# Patient Record
Sex: MALE | Race: WHITE | NOT HISPANIC OR LATINO
[De-identification: names, ages, dates, MRNs, and addresses within clinical notes are randomized per-mention and may not be internally consistent; named-entity substitution may affect disease eponyms.]

---

## 2017-02-14 ENCOUNTER — APPOINTMENT (OUTPATIENT)
Dept: PAIN MANAGEMENT | Facility: CLINIC | Age: 62
End: 2017-02-14

## 2017-02-14 VITALS
DIASTOLIC BLOOD PRESSURE: 77 MMHG | WEIGHT: 173 LBS | BODY MASS INDEX: 24.77 KG/M2 | HEART RATE: 60 BPM | SYSTOLIC BLOOD PRESSURE: 110 MMHG | HEIGHT: 70 IN

## 2017-04-14 ENCOUNTER — NON-APPOINTMENT (OUTPATIENT)
Age: 62
End: 2017-04-14

## 2017-04-14 ENCOUNTER — APPOINTMENT (OUTPATIENT)
Dept: ELECTROPHYSIOLOGY | Facility: CLINIC | Age: 62
End: 2017-04-14

## 2017-04-14 VITALS
SYSTOLIC BLOOD PRESSURE: 111 MMHG | BODY MASS INDEX: 24.77 KG/M2 | HEART RATE: 57 BPM | DIASTOLIC BLOOD PRESSURE: 77 MMHG | OXYGEN SATURATION: 100 % | WEIGHT: 173 LBS | HEIGHT: 70 IN

## 2017-04-14 DIAGNOSIS — I10 ESSENTIAL (PRIMARY) HYPERTENSION: ICD-10-CM

## 2017-04-21 ENCOUNTER — OUTPATIENT (OUTPATIENT)
Dept: OUTPATIENT SERVICES | Facility: HOSPITAL | Age: 62
LOS: 1 days | End: 2017-04-21
Payer: COMMERCIAL

## 2017-04-21 ENCOUNTER — APPOINTMENT (OUTPATIENT)
Dept: CV DIAGNOSITCS | Facility: HOSPITAL | Age: 62
End: 2017-04-21

## 2017-04-21 DIAGNOSIS — I10 ESSENTIAL (PRIMARY) HYPERTENSION: ICD-10-CM

## 2017-04-21 PROCEDURE — 93325 DOPPLER ECHO COLOR FLOW MAPG: CPT | Mod: 26

## 2017-04-21 PROCEDURE — 93312 ECHO TRANSESOPHAGEAL: CPT | Mod: 26

## 2017-04-21 PROCEDURE — 93320 DOPPLER ECHO COMPLETE: CPT | Mod: 26

## 2017-04-21 PROCEDURE — 93312 ECHO TRANSESOPHAGEAL: CPT

## 2017-04-21 PROCEDURE — 76376 3D RENDER W/INTRP POSTPROCES: CPT

## 2017-04-21 PROCEDURE — 93320 DOPPLER ECHO COMPLETE: CPT

## 2017-04-21 PROCEDURE — 76376 3D RENDER W/INTRP POSTPROCES: CPT | Mod: 26

## 2017-04-21 PROCEDURE — 93325 DOPPLER ECHO COLOR FLOW MAPG: CPT

## 2017-04-26 ENCOUNTER — APPOINTMENT (OUTPATIENT)
Dept: CV DIAGNOSITCS | Facility: HOSPITAL | Age: 62
End: 2017-04-26

## 2017-04-26 ENCOUNTER — OUTPATIENT (OUTPATIENT)
Dept: OUTPATIENT SERVICES | Facility: HOSPITAL | Age: 62
LOS: 1 days | End: 2017-04-26
Payer: COMMERCIAL

## 2017-04-26 DIAGNOSIS — I10 ESSENTIAL (PRIMARY) HYPERTENSION: ICD-10-CM

## 2017-04-26 PROCEDURE — 93350 STRESS TTE ONLY: CPT | Mod: 26

## 2017-04-26 PROCEDURE — 93325 DOPPLER ECHO COLOR FLOW MAPG: CPT

## 2017-04-26 PROCEDURE — 93016 CV STRESS TEST SUPVJ ONLY: CPT

## 2017-04-26 PROCEDURE — 93018 CV STRESS TEST I&R ONLY: CPT

## 2017-04-26 PROCEDURE — 93320 DOPPLER ECHO COMPLETE: CPT

## 2017-04-26 PROCEDURE — 93351 STRESS TTE COMPLETE: CPT

## 2017-04-26 PROCEDURE — 93325 DOPPLER ECHO COLOR FLOW MAPG: CPT | Mod: 26

## 2017-04-26 PROCEDURE — 93320 DOPPLER ECHO COMPLETE: CPT | Mod: 26

## 2017-04-27 ENCOUNTER — FORM ENCOUNTER (OUTPATIENT)
Age: 62
End: 2017-04-27

## 2017-05-08 ENCOUNTER — CHART COPY (OUTPATIENT)
Age: 62
End: 2017-05-08

## 2017-05-19 ENCOUNTER — APPOINTMENT (OUTPATIENT)
Dept: ELECTROPHYSIOLOGY | Facility: CLINIC | Age: 62
End: 2017-05-19

## 2017-05-22 ENCOUNTER — APPOINTMENT (OUTPATIENT)
Dept: PAIN MANAGEMENT | Facility: CLINIC | Age: 62
End: 2017-05-22

## 2017-05-22 VITALS
SYSTOLIC BLOOD PRESSURE: 111 MMHG | BODY MASS INDEX: 24.77 KG/M2 | DIASTOLIC BLOOD PRESSURE: 75 MMHG | HEIGHT: 70 IN | WEIGHT: 173 LBS | HEART RATE: 53 BPM

## 2017-05-25 ENCOUNTER — FORM ENCOUNTER (OUTPATIENT)
Age: 62
End: 2017-05-25

## 2017-05-26 ENCOUNTER — APPOINTMENT (OUTPATIENT)
Dept: MRI IMAGING | Facility: HOSPITAL | Age: 62
End: 2017-05-26

## 2017-05-26 ENCOUNTER — OUTPATIENT (OUTPATIENT)
Dept: OUTPATIENT SERVICES | Facility: HOSPITAL | Age: 62
LOS: 1 days | End: 2017-05-26
Payer: COMMERCIAL

## 2017-05-26 DIAGNOSIS — Z00.00 ENCOUNTER FOR GENERAL ADULT MEDICAL EXAMINATION WITHOUT ABNORMAL FINDINGS: ICD-10-CM

## 2017-05-26 PROCEDURE — 75561 CARDIAC MRI FOR MORPH W/DYE: CPT | Mod: 26

## 2017-05-26 PROCEDURE — A9585: CPT

## 2017-05-26 PROCEDURE — 75561 CARDIAC MRI FOR MORPH W/DYE: CPT

## 2017-06-16 ENCOUNTER — OUTPATIENT (OUTPATIENT)
Dept: OUTPATIENT SERVICES | Facility: HOSPITAL | Age: 62
LOS: 1 days | End: 2017-06-16
Payer: COMMERCIAL

## 2017-06-16 VITALS
OXYGEN SATURATION: 100 % | TEMPERATURE: 98 F | DIASTOLIC BLOOD PRESSURE: 75 MMHG | SYSTOLIC BLOOD PRESSURE: 118 MMHG | RESPIRATION RATE: 16 BRPM | HEART RATE: 48 BPM | WEIGHT: 167.99 LBS | HEIGHT: 70 IN

## 2017-06-16 DIAGNOSIS — R55 SYNCOPE AND COLLAPSE: ICD-10-CM

## 2017-06-16 DIAGNOSIS — R00.2 PALPITATIONS: ICD-10-CM

## 2017-06-16 DIAGNOSIS — Z90.89 ACQUIRED ABSENCE OF OTHER ORGANS: Chronic | ICD-10-CM

## 2017-06-16 LAB
ALBUMIN SERPL ELPH-MCNC: 4.3 G/DL — SIGNIFICANT CHANGE UP (ref 3.3–5)
ALP SERPL-CCNC: 55 U/L — SIGNIFICANT CHANGE UP (ref 40–120)
ALT FLD-CCNC: 16 U/L RC — SIGNIFICANT CHANGE UP (ref 10–45)
ANION GAP SERPL CALC-SCNC: 14 MMOL/L — SIGNIFICANT CHANGE UP (ref 5–17)
AST SERPL-CCNC: 22 U/L — SIGNIFICANT CHANGE UP (ref 10–40)
BILIRUB SERPL-MCNC: 0.6 MG/DL — SIGNIFICANT CHANGE UP (ref 0.2–1.2)
BUN SERPL-MCNC: 20 MG/DL — SIGNIFICANT CHANGE UP (ref 7–23)
CALCIUM SERPL-MCNC: 9.5 MG/DL — SIGNIFICANT CHANGE UP (ref 8.4–10.5)
CHLORIDE SERPL-SCNC: 108 MMOL/L — SIGNIFICANT CHANGE UP (ref 96–108)
CO2 SERPL-SCNC: 20 MMOL/L — LOW (ref 22–31)
CREAT SERPL-MCNC: 1.07 MG/DL — SIGNIFICANT CHANGE UP (ref 0.5–1.3)
GLUCOSE SERPL-MCNC: 90 MG/DL — SIGNIFICANT CHANGE UP (ref 70–99)
HCT VFR BLD CALC: 43.1 % — SIGNIFICANT CHANGE UP (ref 39–50)
HGB BLD-MCNC: 15.1 G/DL — SIGNIFICANT CHANGE UP (ref 13–17)
MCHC RBC-ENTMCNC: 33.7 PG — SIGNIFICANT CHANGE UP (ref 27–34)
MCHC RBC-ENTMCNC: 34.9 GM/DL — SIGNIFICANT CHANGE UP (ref 32–36)
MCV RBC AUTO: 96.4 FL — SIGNIFICANT CHANGE UP (ref 80–100)
PLATELET # BLD AUTO: 164 K/UL — SIGNIFICANT CHANGE UP (ref 150–400)
POTASSIUM SERPL-MCNC: 4.9 MMOL/L — SIGNIFICANT CHANGE UP (ref 3.5–5.3)
POTASSIUM SERPL-SCNC: 4.9 MMOL/L — SIGNIFICANT CHANGE UP (ref 3.5–5.3)
PROT SERPL-MCNC: 7.2 G/DL — SIGNIFICANT CHANGE UP (ref 6–8.3)
RBC # BLD: 4.47 M/UL — SIGNIFICANT CHANGE UP (ref 4.2–5.8)
RBC # FLD: 11.4 % — SIGNIFICANT CHANGE UP (ref 10.3–14.5)
SODIUM SERPL-SCNC: 142 MMOL/L — SIGNIFICANT CHANGE UP (ref 135–145)
WBC # BLD: 4.2 K/UL — SIGNIFICANT CHANGE UP (ref 3.8–10.5)
WBC # FLD AUTO: 4.2 K/UL — SIGNIFICANT CHANGE UP (ref 3.8–10.5)

## 2017-06-16 PROCEDURE — 33282: CPT | Mod: 59

## 2017-06-16 PROCEDURE — 85027 COMPLETE CBC AUTOMATED: CPT

## 2017-06-16 PROCEDURE — 80053 COMPREHEN METABOLIC PANEL: CPT

## 2017-06-16 PROCEDURE — 93010 ELECTROCARDIOGRAM REPORT: CPT

## 2017-06-16 PROCEDURE — 93005 ELECTROCARDIOGRAM TRACING: CPT

## 2017-06-16 PROCEDURE — C1730: CPT

## 2017-06-16 PROCEDURE — C1764: CPT

## 2017-06-16 PROCEDURE — 93620 COMP EP EVL R AT VEN PAC&REC: CPT | Mod: 26

## 2017-06-16 PROCEDURE — 93620 COMP EP EVL R AT VEN PAC&REC: CPT

## 2017-06-16 RX ORDER — SODIUM CHLORIDE 9 MG/ML
3 INJECTION INTRAMUSCULAR; INTRAVENOUS; SUBCUTANEOUS EVERY 8 HOURS
Qty: 0 | Refills: 0 | Status: DISCONTINUED | OUTPATIENT
Start: 2017-06-16 | End: 2017-07-01

## 2017-06-16 NOTE — H&P CARDIOLOGY - FAMILY HISTORY
Father  Still living? No  Family history of bladder cancer, Age at diagnosis: Age Unknown     Grandparent  Still living? No  Family history of heart attack, Age at diagnosis: Age Unknown  Congestive heart failure, Age at diagnosis: Age Unknown

## 2017-06-16 NOTE — H&P CARDIOLOGY - PMH
Asthma  exercise induced  Connective tissue disease, undifferentiated    HLD (hyperlipidemia)    HTN (hypertension)    Migraine

## 2017-06-16 NOTE — H&P CARDIOLOGY - HISTORY OF PRESENT ILLNESS
62 year old male pt with PMHx of HTN, HLD, exercise induced asthma, undifferentiated connective tissue disease presents for EPS and Loop recorder insertion. Pt endorse he had pre syncopal episode few times and fatigue found to having tachycardia, evaluated by Dr. Emmanuel and referred to further EP evaluation. 62 year old male pt with PMHx of HTN, HLD, exercise induced asthma, undifferentiated connective tissue disease presents for EPS and Loop recorder insertion. Pt endorses he had pre syncopal episode few times and fatigue, found to having tachycardia, evaluated by Dr. Emmanuel and referred to further EP evaluation.

## 2017-06-23 ENCOUNTER — NON-APPOINTMENT (OUTPATIENT)
Age: 62
End: 2017-06-23

## 2017-06-23 ENCOUNTER — APPOINTMENT (OUTPATIENT)
Dept: ELECTROPHYSIOLOGY | Facility: CLINIC | Age: 62
End: 2017-06-23

## 2017-06-23 VITALS — SYSTOLIC BLOOD PRESSURE: 121 MMHG | HEART RATE: 57 BPM | OXYGEN SATURATION: 100 % | DIASTOLIC BLOOD PRESSURE: 78 MMHG

## 2017-08-07 ENCOUNTER — APPOINTMENT (OUTPATIENT)
Dept: ELECTROPHYSIOLOGY | Facility: CLINIC | Age: 62
End: 2017-08-07
Payer: COMMERCIAL

## 2017-08-07 PROCEDURE — 93298 REM INTERROG DEV EVAL SCRMS: CPT

## 2017-08-21 ENCOUNTER — APPOINTMENT (OUTPATIENT)
Dept: PAIN MANAGEMENT | Facility: CLINIC | Age: 62
End: 2017-08-21
Payer: COMMERCIAL

## 2017-08-21 VITALS
HEIGHT: 70 IN | DIASTOLIC BLOOD PRESSURE: 80 MMHG | BODY MASS INDEX: 24.05 KG/M2 | HEART RATE: 51 BPM | SYSTOLIC BLOOD PRESSURE: 121 MMHG | WEIGHT: 168 LBS

## 2017-08-21 PROCEDURE — 99213 OFFICE O/P EST LOW 20 MIN: CPT | Mod: 25

## 2017-08-21 PROCEDURE — 64615 CHEMODENERV MUSC MIGRAINE: CPT

## 2017-08-25 ENCOUNTER — NON-APPOINTMENT (OUTPATIENT)
Age: 62
End: 2017-08-25

## 2017-08-25 ENCOUNTER — APPOINTMENT (OUTPATIENT)
Dept: ELECTROPHYSIOLOGY | Facility: CLINIC | Age: 62
End: 2017-08-25
Payer: COMMERCIAL

## 2017-08-25 VITALS — DIASTOLIC BLOOD PRESSURE: 68 MMHG | SYSTOLIC BLOOD PRESSURE: 104 MMHG | HEART RATE: 69 BPM | OXYGEN SATURATION: 100 %

## 2017-08-25 PROCEDURE — 99215 OFFICE O/P EST HI 40 MIN: CPT | Mod: 25,24

## 2017-08-25 PROCEDURE — 93000 ELECTROCARDIOGRAM COMPLETE: CPT

## 2017-08-28 ENCOUNTER — OUTPATIENT (OUTPATIENT)
Dept: OUTPATIENT SERVICES | Facility: HOSPITAL | Age: 62
LOS: 1 days | End: 2017-08-28
Payer: COMMERCIAL

## 2017-08-28 VITALS
WEIGHT: 164.91 LBS | OXYGEN SATURATION: 99 % | HEIGHT: 70 IN | RESPIRATION RATE: 16 BRPM | SYSTOLIC BLOOD PRESSURE: 124 MMHG | HEART RATE: 67 BPM | DIASTOLIC BLOOD PRESSURE: 83 MMHG | TEMPERATURE: 98 F

## 2017-08-28 DIAGNOSIS — I49.9 CARDIAC ARRHYTHMIA, UNSPECIFIED: ICD-10-CM

## 2017-08-28 DIAGNOSIS — Z01.818 ENCOUNTER FOR OTHER PREPROCEDURAL EXAMINATION: ICD-10-CM

## 2017-08-28 DIAGNOSIS — Z90.89 ACQUIRED ABSENCE OF OTHER ORGANS: Chronic | ICD-10-CM

## 2017-08-28 LAB
ALBUMIN SERPL ELPH-MCNC: 4.5 G/DL — SIGNIFICANT CHANGE UP (ref 3.3–5)
ALP SERPL-CCNC: 57 U/L — SIGNIFICANT CHANGE UP (ref 40–120)
ALT FLD-CCNC: 14 U/L RC — SIGNIFICANT CHANGE UP (ref 10–45)
ANION GAP SERPL CALC-SCNC: 12 MMOL/L — SIGNIFICANT CHANGE UP (ref 5–17)
APTT BLD: 36 SEC — SIGNIFICANT CHANGE UP (ref 27.5–37.4)
AST SERPL-CCNC: 17 U/L — SIGNIFICANT CHANGE UP (ref 10–40)
BILIRUB SERPL-MCNC: 0.7 MG/DL — SIGNIFICANT CHANGE UP (ref 0.2–1.2)
BLD GP AB SCN SERPL QL: NEGATIVE — SIGNIFICANT CHANGE UP
BUN SERPL-MCNC: 20 MG/DL — SIGNIFICANT CHANGE UP (ref 7–23)
CALCIUM SERPL-MCNC: 9.3 MG/DL — SIGNIFICANT CHANGE UP (ref 8.4–10.5)
CHLORIDE SERPL-SCNC: 106 MMOL/L — SIGNIFICANT CHANGE UP (ref 96–108)
CO2 SERPL-SCNC: 24 MMOL/L — SIGNIFICANT CHANGE UP (ref 22–31)
CREAT SERPL-MCNC: 1.08 MG/DL — SIGNIFICANT CHANGE UP (ref 0.5–1.3)
GLUCOSE SERPL-MCNC: 79 MG/DL — SIGNIFICANT CHANGE UP (ref 70–99)
HCT VFR BLD CALC: 43.3 % — SIGNIFICANT CHANGE UP (ref 39–50)
HGB BLD-MCNC: 14.8 G/DL — SIGNIFICANT CHANGE UP (ref 13–17)
INR BLD: 1.2 RATIO — HIGH (ref 0.88–1.16)
MCHC RBC-ENTMCNC: 33.4 PG — SIGNIFICANT CHANGE UP (ref 27–34)
MCHC RBC-ENTMCNC: 34.1 GM/DL — SIGNIFICANT CHANGE UP (ref 32–36)
MCV RBC AUTO: 97.8 FL — SIGNIFICANT CHANGE UP (ref 80–100)
PLATELET # BLD AUTO: 177 K/UL — SIGNIFICANT CHANGE UP (ref 150–400)
POTASSIUM SERPL-MCNC: 4.1 MMOL/L — SIGNIFICANT CHANGE UP (ref 3.5–5.3)
POTASSIUM SERPL-SCNC: 4.1 MMOL/L — SIGNIFICANT CHANGE UP (ref 3.5–5.3)
PROT SERPL-MCNC: 7.3 G/DL — SIGNIFICANT CHANGE UP (ref 6–8.3)
PROTHROM AB SERPL-ACNC: 13.1 SEC — HIGH (ref 9.8–12.7)
RBC # BLD: 4.42 M/UL — SIGNIFICANT CHANGE UP (ref 4.2–5.8)
RBC # FLD: 11.4 % — SIGNIFICANT CHANGE UP (ref 10.3–14.5)
RH IG SCN BLD-IMP: POSITIVE — SIGNIFICANT CHANGE UP
SODIUM SERPL-SCNC: 142 MMOL/L — SIGNIFICANT CHANGE UP (ref 135–145)
WBC # BLD: 3.4 K/UL — LOW (ref 3.8–10.5)
WBC # FLD AUTO: 3.4 K/UL — LOW (ref 3.8–10.5)

## 2017-08-28 PROCEDURE — 85730 THROMBOPLASTIN TIME PARTIAL: CPT

## 2017-08-28 PROCEDURE — 85610 PROTHROMBIN TIME: CPT

## 2017-08-28 PROCEDURE — 86901 BLOOD TYPING SEROLOGIC RH(D): CPT

## 2017-08-28 PROCEDURE — 86900 BLOOD TYPING SEROLOGIC ABO: CPT

## 2017-08-28 PROCEDURE — 86850 RBC ANTIBODY SCREEN: CPT

## 2017-08-28 PROCEDURE — 80053 COMPREHEN METABOLIC PANEL: CPT

## 2017-08-28 PROCEDURE — 85027 COMPLETE CBC AUTOMATED: CPT

## 2017-08-28 RX ORDER — CHOLECALCIFEROL (VITAMIN D3) 125 MCG
1 CAPSULE ORAL
Qty: 0 | Refills: 0 | COMMUNITY

## 2017-08-28 RX ORDER — MONTELUKAST 4 MG/1
1 TABLET, CHEWABLE ORAL
Qty: 0 | Refills: 0 | COMMUNITY

## 2017-08-28 RX ORDER — TOPIRAMATE 25 MG
1 TABLET ORAL
Qty: 0 | Refills: 0 | COMMUNITY

## 2017-08-28 RX ORDER — FINASTERIDE 5 MG/1
1 TABLET, FILM COATED ORAL
Qty: 0 | Refills: 0 | COMMUNITY

## 2017-08-28 NOTE — H&P CARDIOLOGY - HISTORY OF PRESENT ILLNESS
This is a 62 year old male with PMHx of HTN, HLD, Afib ( ON Eliquis, recently diagnosed w/ Loop recorder inserted on 06/2017 ), prolapsed MV, exercise induced asthma, undifferentiated connective tissue disease, Migraines. Pt endorses he had pre syncopal episode few times and fatigue, seen and evaluated by Dr. Emmanuel.  Presents here today for PST only to come back on the 9/7/17 for afib ablation. Currently asymptomatic, denies any chest pain, dyspnea, dizziness, palpitations, N&V, HA.

## 2017-09-07 ENCOUNTER — OUTPATIENT (OUTPATIENT)
Dept: INPATIENT UNIT | Facility: HOSPITAL | Age: 62
LOS: 1 days | End: 2017-09-07
Payer: COMMERCIAL

## 2017-09-07 VITALS — TEMPERATURE: 99 F | OXYGEN SATURATION: 99 % | HEART RATE: 79 BPM

## 2017-09-07 DIAGNOSIS — I49.9 CARDIAC ARRHYTHMIA, UNSPECIFIED: ICD-10-CM

## 2017-09-07 DIAGNOSIS — Z90.89 ACQUIRED ABSENCE OF OTHER ORGANS: Chronic | ICD-10-CM

## 2017-09-07 LAB
ANION GAP SERPL CALC-SCNC: 13 MMOL/L — SIGNIFICANT CHANGE UP (ref 5–17)
APTT BLD: > 200 SEC (ref 27.5–37.4)
APTT BLD: > 200 SEC (ref 27.5–37.4)
BLD GP AB SCN SERPL QL: NEGATIVE — SIGNIFICANT CHANGE UP
BUN SERPL-MCNC: 19 MG/DL — SIGNIFICANT CHANGE UP (ref 7–23)
CALCIUM SERPL-MCNC: 8.3 MG/DL — LOW (ref 8.4–10.5)
CHLORIDE SERPL-SCNC: 111 MMOL/L — HIGH (ref 96–108)
CO2 SERPL-SCNC: 21 MMOL/L — LOW (ref 22–31)
CREAT SERPL-MCNC: 0.98 MG/DL — SIGNIFICANT CHANGE UP (ref 0.5–1.3)
GLUCOSE SERPL-MCNC: 160 MG/DL — HIGH (ref 70–99)
HCT VFR BLD CALC: 36.5 % — LOW (ref 39–50)
HGB BLD-MCNC: 12.9 G/DL — LOW (ref 13–17)
INR BLD: 1.21 RATIO — HIGH (ref 0.88–1.16)
INR BLD: 1.56 RATIO — HIGH (ref 0.88–1.16)
MAGNESIUM SERPL-MCNC: 1.8 MG/DL — SIGNIFICANT CHANGE UP (ref 1.6–2.6)
MCHC RBC-ENTMCNC: 34.5 PG — HIGH (ref 27–34)
MCHC RBC-ENTMCNC: 35.3 GM/DL — SIGNIFICANT CHANGE UP (ref 32–36)
MCV RBC AUTO: 97.6 FL — SIGNIFICANT CHANGE UP (ref 80–100)
PHOSPHATE SERPL-MCNC: 2.8 MG/DL — SIGNIFICANT CHANGE UP (ref 2.5–4.5)
PLATELET # BLD AUTO: 140 K/UL — LOW (ref 150–400)
POTASSIUM SERPL-MCNC: 3.7 MMOL/L — SIGNIFICANT CHANGE UP (ref 3.5–5.3)
POTASSIUM SERPL-SCNC: 3.7 MMOL/L — SIGNIFICANT CHANGE UP (ref 3.5–5.3)
PROTHROM AB SERPL-ACNC: 13.1 SEC — HIGH (ref 9.8–12.7)
PROTHROM AB SERPL-ACNC: 17 SEC — HIGH (ref 9.8–12.7)
RBC # BLD: 3.74 M/UL — LOW (ref 4.2–5.8)
RBC # FLD: 11.6 % — SIGNIFICANT CHANGE UP (ref 10.3–14.5)
RH IG SCN BLD-IMP: POSITIVE — SIGNIFICANT CHANGE UP
SODIUM SERPL-SCNC: 145 MMOL/L — SIGNIFICANT CHANGE UP (ref 135–145)
WBC # BLD: 7.5 K/UL — SIGNIFICANT CHANGE UP (ref 3.8–10.5)
WBC # FLD AUTO: 7.5 K/UL — SIGNIFICANT CHANGE UP (ref 3.8–10.5)

## 2017-09-07 PROCEDURE — 93010 ELECTROCARDIOGRAM REPORT: CPT | Mod: 77

## 2017-09-07 PROCEDURE — 93010 ELECTROCARDIOGRAM REPORT: CPT

## 2017-09-07 PROCEDURE — 93662 INTRACARDIAC ECG (ICE): CPT | Mod: 26

## 2017-09-07 PROCEDURE — 93613 INTRACARDIAC EPHYS 3D MAPG: CPT

## 2017-09-07 PROCEDURE — 93657 TX L/R ATRIAL FIB ADDL: CPT | Mod: 78

## 2017-09-07 PROCEDURE — 93623 PRGRMD STIMJ&PACG IV RX NFS: CPT | Mod: 26

## 2017-09-07 PROCEDURE — 93656 COMPRE EP EVAL ABLTJ ATR FIB: CPT

## 2017-09-07 RX ORDER — PANTOPRAZOLE SODIUM 20 MG/1
40 TABLET, DELAYED RELEASE ORAL
Qty: 0 | Refills: 0 | Status: DISCONTINUED | OUTPATIENT
Start: 2017-09-07 | End: 2017-09-08

## 2017-09-07 RX ORDER — HEPARIN SODIUM 5000 [USP'U]/ML
4400 INJECTION INTRAVENOUS; SUBCUTANEOUS EVERY 6 HOURS
Qty: 0 | Refills: 0 | Status: DISCONTINUED | OUTPATIENT
Start: 2017-09-07 | End: 2017-09-08

## 2017-09-07 RX ORDER — INFLUENZA VIRUS VACCINE 15; 15; 15; 15 UG/.5ML; UG/.5ML; UG/.5ML; UG/.5ML
0.5 SUSPENSION INTRAMUSCULAR ONCE
Qty: 0 | Refills: 0 | Status: COMPLETED | OUTPATIENT
Start: 2017-09-07 | End: 2017-09-08

## 2017-09-07 RX ORDER — POTASSIUM CHLORIDE 20 MEQ
40 PACKET (EA) ORAL ONCE
Qty: 0 | Refills: 0 | Status: COMPLETED | OUTPATIENT
Start: 2017-09-07 | End: 2017-09-07

## 2017-09-07 RX ORDER — FINASTERIDE 5 MG/1
5 TABLET, FILM COATED ORAL AT BEDTIME
Qty: 0 | Refills: 0 | Status: DISCONTINUED | OUTPATIENT
Start: 2017-09-07 | End: 2017-09-08

## 2017-09-07 RX ORDER — MONTELUKAST 4 MG/1
10 TABLET, CHEWABLE ORAL DAILY
Qty: 0 | Refills: 0 | Status: DISCONTINUED | OUTPATIENT
Start: 2017-09-07 | End: 2017-09-08

## 2017-09-07 RX ORDER — MAGNESIUM SULFATE 500 MG/ML
2 VIAL (ML) INJECTION ONCE
Qty: 0 | Refills: 0 | Status: COMPLETED | OUTPATIENT
Start: 2017-09-07 | End: 2017-09-07

## 2017-09-07 RX ORDER — ACETAMINOPHEN 500 MG
650 TABLET ORAL ONCE
Qty: 0 | Refills: 0 | Status: COMPLETED | OUTPATIENT
Start: 2017-09-07 | End: 2017-09-07

## 2017-09-07 RX ORDER — SUMATRIPTAN SUCCINATE 4 MG/.5ML
50 INJECTION, SOLUTION SUBCUTANEOUS ONCE
Qty: 0 | Refills: 0 | Status: COMPLETED | OUTPATIENT
Start: 2017-09-07 | End: 2017-09-08

## 2017-09-07 RX ORDER — TOPIRAMATE 25 MG
100 TABLET ORAL DAILY
Qty: 0 | Refills: 0 | Status: DISCONTINUED | OUTPATIENT
Start: 2017-09-07 | End: 2017-09-07

## 2017-09-07 RX ORDER — LOSARTAN POTASSIUM 100 MG/1
25 TABLET, FILM COATED ORAL DAILY
Qty: 0 | Refills: 0 | Status: DISCONTINUED | OUTPATIENT
Start: 2017-09-07 | End: 2017-09-07

## 2017-09-07 RX ORDER — CHOLECALCIFEROL (VITAMIN D3) 125 MCG
1000 CAPSULE ORAL DAILY
Qty: 0 | Refills: 0 | Status: DISCONTINUED | OUTPATIENT
Start: 2017-09-07 | End: 2017-09-08

## 2017-09-07 RX ORDER — HEPARIN SODIUM 5000 [USP'U]/ML
INJECTION INTRAVENOUS; SUBCUTANEOUS
Qty: 25000 | Refills: 0 | Status: DISCONTINUED | OUTPATIENT
Start: 2017-09-07 | End: 2017-09-08

## 2017-09-07 RX ORDER — SUCRALFATE 1 G
1 TABLET ORAL EVERY 6 HOURS
Qty: 0 | Refills: 0 | Status: DISCONTINUED | OUTPATIENT
Start: 2017-09-07 | End: 2017-09-08

## 2017-09-07 RX ORDER — ATORVASTATIN CALCIUM 80 MG/1
10 TABLET, FILM COATED ORAL AT BEDTIME
Qty: 0 | Refills: 0 | Status: DISCONTINUED | OUTPATIENT
Start: 2017-09-07 | End: 2017-09-07

## 2017-09-07 RX ADMIN — Medication 40 MILLIEQUIVALENT(S): at 22:17

## 2017-09-07 RX ADMIN — Medication 50 GRAM(S): at 22:17

## 2017-09-07 RX ADMIN — Medication 1 GRAM(S): at 21:16

## 2017-09-07 RX ADMIN — Medication 40 MILLIGRAM(S): at 22:17

## 2017-09-07 RX ADMIN — MONTELUKAST 10 MILLIGRAM(S): 4 TABLET, CHEWABLE ORAL at 22:16

## 2017-09-07 RX ADMIN — Medication 650 MILLIGRAM(S): at 21:16

## 2017-09-07 RX ADMIN — FINASTERIDE 5 MILLIGRAM(S): 5 TABLET, FILM COATED ORAL at 21:16

## 2017-09-07 NOTE — CHART NOTE - NSCHARTNOTEFT_GEN_A_CORE
====================  CCU MIDNIGHT ROUNDS  ====================    KARO RAMIREZ  [id number]  Patient is a 62y old  Male who presents with a chief complaint of     ====================  SUMMARY:  ====================  62 year old male with PMHx of HTN, HLD, Afib ( ON Eliquis, recently diagnosed w/ Loop recorder inserted on 06/2017 ), prolapsed MV, exercise induced asthma, undifferentiated connective tissue disease, migraines. Pt endorses he had pre syncopal episode few times, now s/p Afib ablation    ====================  NEW EVENTS:  ====================  Remains in NSR.  No c/o pein on right groin.  No hematoma/bleeding noted.  Had small amount of gum bleeding.  APTT >200 at 1900. Repeated prior to starting Heparin drip.    MEDICATIONS  (STANDING):  pantoprazole    Tablet 40 milliGRAM(s) Oral before breakfast  sucralfate 1 Gram(s) Oral every 6 hours  influenza   Vaccine 0.5 milliLiter(s) IntraMuscular once  finasteride 5 milliGRAM(s) Oral at bedtime  montelukast 10 milliGRAM(s) Oral daily  cholecalciferol 1000 Unit(s) Oral daily  heparin  Infusion.  Unit(s)/Hr (9 mL/Hr) IV Continuous <Continuous>  pravastatin 40 milliGRAM(s) Oral at bedtime  topiramate ER (Trokendi XR) 100 milliGRAM(s) 100 milliGRAM(s) Oral daily    MEDICATIONS  (PRN):  SUMAtriptan 50 milliGRAM(s) Oral once PRN migraine  heparin  Injectable 4400 Unit(s) IV Push every 6 hours PRN For aPTT less than 40    ====================  VITALS (Last 12 hrs):  ====================    T(C): 36.7 (09-07-17 @ 19:30), Max: 37.1 (09-07-17 @ 18:23)  T(F): 98 (09-07-17 @ 19:30), Max: 98.8 (09-07-17 @ 18:23)  HR: 70 (09-07-17 @ 21:30) (70 - 79)  BP: 111/71 (09-07-17 @ 21:00) (78/64 - 111/71)  BP(mean): 82 (09-07-17 @ 21:00) (70 - 84)  ABP: --  ABP(mean): --  RR: 14 (09-07-17 @ 21:30) (12 - 21)  SpO2: 100% (09-07-17 @ 21:30) (97% - 100%)  Wt(kg): --  CVP(mm Hg): --  CVP(cm H2O): --  CO: --  CI: --  PA: --  PA(mean): --  PCWP: --  SVR: --  PVR: --    I&O's Summary    07 Sep 2017 07:01  -  07 Sep 2017 22:18  --------------------------------------------------------  IN: 500 mL / OUT: 550 mL / NET: -50 mL    ====================  NEW LABS:  ====================  09-07    145  |  111<H>  |  19  ----------------------------<  160<H>  3.7   |  21<L>  |  0.98    Ca    8.3<L>      07 Sep 2017 19:12  Phos  2.8     09-07  Mg     1.8     09-07    PT/INR - ( 07 Sep 2017 19:12 )   PT: 17.0 sec;   INR: 1.56 ratio      PTT - ( 07 Sep 2017 19:12 )  PTT:> 200 sec    ====================  PLAN:  ====================  - Monitor right groin for bleeding  - Monitor coags  - Start Heparin drip if APTT therapeutic  - Continue PPI and Carafate  - TTE in am to r/o pericardial effusion; switch Heparin to Eliquis if negative  - Hold Losartan (home med) for now and resume when BP tolerates  - Continue home meds for migraine HA's  - Mechanical soft diet  - Discontinue right groin suture   - Discontinue glass and right radial A-line in am  - Discharge home if no pericardial effusion     Cari Lira CCU NP  Beeper #[id number]  Spectra # [phone]

## 2017-09-07 NOTE — PATIENT PROFILE ADULT. - VISION (WITH CORRECTIVE LENSES IF THE PATIENT USUALLY WEARS THEM):
Partially impaired: cannot see medication labels or newsprint, but can see obstacles in path, and the surrounding layout; can count fingers at arm's length/eyeglasses

## 2017-09-07 NOTE — PROVIDER CONTACT NOTE (CRITICAL VALUE NOTIFICATION) - ACTION/TREATMENT ORDERED:
maintain bleeding precautions, and continue to monitor for s/s of bleeding
Above provider aware. bleeding precautions

## 2017-09-08 ENCOUNTER — TRANSCRIPTION ENCOUNTER (OUTPATIENT)
Age: 62
End: 2017-09-08

## 2017-09-08 VITALS
HEART RATE: 73 BPM | RESPIRATION RATE: 18 BRPM | TEMPERATURE: 98 F | OXYGEN SATURATION: 99 % | SYSTOLIC BLOOD PRESSURE: 97 MMHG | DIASTOLIC BLOOD PRESSURE: 64 MMHG

## 2017-09-08 DIAGNOSIS — I10 ESSENTIAL (PRIMARY) HYPERTENSION: ICD-10-CM

## 2017-09-08 DIAGNOSIS — I48.1 PERSISTENT ATRIAL FIBRILLATION: ICD-10-CM

## 2017-09-08 LAB
ANION GAP SERPL CALC-SCNC: 10 MMOL/L — SIGNIFICANT CHANGE UP (ref 5–17)
APTT BLD: 27.7 SEC — SIGNIFICANT CHANGE UP (ref 27.5–37.4)
APTT BLD: 43.7 SEC — HIGH (ref 27.5–37.4)
BUN SERPL-MCNC: 20 MG/DL — SIGNIFICANT CHANGE UP (ref 7–23)
CALCIUM SERPL-MCNC: 8.4 MG/DL — SIGNIFICANT CHANGE UP (ref 8.4–10.5)
CHLORIDE SERPL-SCNC: 110 MMOL/L — HIGH (ref 96–108)
CO2 SERPL-SCNC: 20 MMOL/L — LOW (ref 22–31)
CREAT SERPL-MCNC: 1.05 MG/DL — SIGNIFICANT CHANGE UP (ref 0.5–1.3)
GLUCOSE SERPL-MCNC: 144 MG/DL — HIGH (ref 70–99)
HCT VFR BLD CALC: 32.9 % — LOW (ref 39–50)
HCT VFR BLD CALC: 35.9 % — LOW (ref 39–50)
HGB BLD-MCNC: 11.7 G/DL — LOW (ref 13–17)
HGB BLD-MCNC: 12.5 G/DL — LOW (ref 13–17)
INR BLD: 1.11 RATIO — SIGNIFICANT CHANGE UP (ref 0.88–1.16)
MAGNESIUM SERPL-MCNC: 1.9 MG/DL — SIGNIFICANT CHANGE UP (ref 1.6–2.6)
MCHC RBC-ENTMCNC: 34.4 PG — HIGH (ref 27–34)
MCHC RBC-ENTMCNC: 34.8 GM/DL — SIGNIFICANT CHANGE UP (ref 32–36)
MCHC RBC-ENTMCNC: 35 PG — HIGH (ref 27–34)
MCHC RBC-ENTMCNC: 35.6 GM/DL — SIGNIFICANT CHANGE UP (ref 32–36)
MCV RBC AUTO: 98.2 FL — SIGNIFICANT CHANGE UP (ref 80–100)
MCV RBC AUTO: 98.8 FL — SIGNIFICANT CHANGE UP (ref 80–100)
PHOSPHATE SERPL-MCNC: 2.8 MG/DL — SIGNIFICANT CHANGE UP (ref 2.5–4.5)
PLATELET # BLD AUTO: 138 K/UL — LOW (ref 150–400)
PLATELET # BLD AUTO: 166 K/UL — SIGNIFICANT CHANGE UP (ref 150–400)
POTASSIUM SERPL-MCNC: 4.7 MMOL/L — SIGNIFICANT CHANGE UP (ref 3.5–5.3)
POTASSIUM SERPL-SCNC: 4.7 MMOL/L — SIGNIFICANT CHANGE UP (ref 3.5–5.3)
PROTHROM AB SERPL-ACNC: 12 SEC — SIGNIFICANT CHANGE UP (ref 9.8–12.7)
RBC # BLD: 3.34 M/UL — LOW (ref 4.2–5.8)
RBC # BLD: 3.64 M/UL — LOW (ref 4.2–5.8)
RBC # FLD: 11.7 % — SIGNIFICANT CHANGE UP (ref 10.3–14.5)
RBC # FLD: 11.8 % — SIGNIFICANT CHANGE UP (ref 10.3–14.5)
SODIUM SERPL-SCNC: 140 MMOL/L — SIGNIFICANT CHANGE UP (ref 135–145)
WBC # BLD: 10.8 K/UL — HIGH (ref 3.8–10.5)
WBC # BLD: 7.1 K/UL — SIGNIFICANT CHANGE UP (ref 3.8–10.5)
WBC # FLD AUTO: 10.8 K/UL — HIGH (ref 3.8–10.5)
WBC # FLD AUTO: 7.1 K/UL — SIGNIFICANT CHANGE UP (ref 3.8–10.5)

## 2017-09-08 PROCEDURE — 93662 INTRACARDIAC ECG (ICE): CPT

## 2017-09-08 PROCEDURE — C1894: CPT

## 2017-09-08 PROCEDURE — 93308 TTE F-UP OR LMTD: CPT

## 2017-09-08 PROCEDURE — 86850 RBC ANTIBODY SCREEN: CPT

## 2017-09-08 PROCEDURE — C1766: CPT

## 2017-09-08 PROCEDURE — 93005 ELECTROCARDIOGRAM TRACING: CPT

## 2017-09-08 PROCEDURE — 93613 INTRACARDIAC EPHYS 3D MAPG: CPT

## 2017-09-08 PROCEDURE — 93656 COMPRE EP EVAL ABLTJ ATR FIB: CPT

## 2017-09-08 PROCEDURE — 85027 COMPLETE CBC AUTOMATED: CPT

## 2017-09-08 PROCEDURE — 83735 ASSAY OF MAGNESIUM: CPT

## 2017-09-08 PROCEDURE — 85730 THROMBOPLASTIN TIME PARTIAL: CPT

## 2017-09-08 PROCEDURE — 86900 BLOOD TYPING SEROLOGIC ABO: CPT

## 2017-09-08 PROCEDURE — C1759: CPT

## 2017-09-08 PROCEDURE — 84100 ASSAY OF PHOSPHORUS: CPT

## 2017-09-08 PROCEDURE — C1730: CPT

## 2017-09-08 PROCEDURE — C1732: CPT

## 2017-09-08 PROCEDURE — 93010 ELECTROCARDIOGRAM REPORT: CPT

## 2017-09-08 PROCEDURE — 93657 TX L/R ATRIAL FIB ADDL: CPT

## 2017-09-08 PROCEDURE — C1893: CPT

## 2017-09-08 PROCEDURE — 93623 PRGRMD STIMJ&PACG IV RX NFS: CPT

## 2017-09-08 PROCEDURE — 80048 BASIC METABOLIC PNL TOTAL CA: CPT

## 2017-09-08 PROCEDURE — 90686 IIV4 VACC NO PRSV 0.5 ML IM: CPT

## 2017-09-08 PROCEDURE — 85610 PROTHROMBIN TIME: CPT

## 2017-09-08 PROCEDURE — 86901 BLOOD TYPING SEROLOGIC RH(D): CPT

## 2017-09-08 PROCEDURE — 93321 DOPPLER ECHO F-UP/LMTD STD: CPT

## 2017-09-08 RX ORDER — SUCRALFATE 1 G
1 TABLET ORAL
Qty: 120 | Refills: 0
Start: 2017-09-08 | End: 2017-10-08

## 2017-09-08 RX ORDER — PANTOPRAZOLE SODIUM 20 MG/1
1 TABLET, DELAYED RELEASE ORAL
Qty: 30 | Refills: 0
Start: 2017-09-08 | End: 2017-10-08

## 2017-09-08 RX ORDER — LOSARTAN POTASSIUM 100 MG/1
1 TABLET, FILM COATED ORAL
Qty: 0 | Refills: 0 | COMMUNITY

## 2017-09-08 RX ORDER — APIXABAN 2.5 MG/1
1 TABLET, FILM COATED ORAL
Qty: 0 | Refills: 0 | COMMUNITY

## 2017-09-08 RX ORDER — SODIUM CHLORIDE 9 MG/ML
250 INJECTION INTRAMUSCULAR; INTRAVENOUS; SUBCUTANEOUS ONCE
Qty: 0 | Refills: 0 | Status: COMPLETED | OUTPATIENT
Start: 2017-09-08 | End: 2017-09-08

## 2017-09-08 RX ORDER — ACETAMINOPHEN 500 MG
1000 TABLET ORAL ONCE
Qty: 0 | Refills: 0 | Status: COMPLETED | OUTPATIENT
Start: 2017-09-08 | End: 2017-09-08

## 2017-09-08 RX ORDER — MAGNESIUM SULFATE 500 MG/ML
2 VIAL (ML) INJECTION ONCE
Qty: 0 | Refills: 0 | Status: COMPLETED | OUTPATIENT
Start: 2017-09-08 | End: 2017-09-08

## 2017-09-08 RX ADMIN — Medication 400 MILLIGRAM(S): at 10:49

## 2017-09-08 RX ADMIN — SODIUM CHLORIDE 500 MILLILITER(S): 9 INJECTION INTRAMUSCULAR; INTRAVENOUS; SUBCUTANEOUS at 01:33

## 2017-09-08 RX ADMIN — SUMATRIPTAN SUCCINATE 50 MILLIGRAM(S): 4 INJECTION, SOLUTION SUBCUTANEOUS at 03:29

## 2017-09-08 RX ADMIN — PANTOPRAZOLE SODIUM 40 MILLIGRAM(S): 20 TABLET, DELAYED RELEASE ORAL at 06:28

## 2017-09-08 RX ADMIN — SUMATRIPTAN SUCCINATE 50 MILLIGRAM(S): 4 INJECTION, SOLUTION SUBCUTANEOUS at 04:00

## 2017-09-08 RX ADMIN — HEPARIN SODIUM 1050 UNIT(S)/HR: 5000 INJECTION INTRAVENOUS; SUBCUTANEOUS at 12:23

## 2017-09-08 RX ADMIN — Medication 1 GRAM(S): at 12:15

## 2017-09-08 RX ADMIN — HEPARIN SODIUM 900 UNIT(S)/HR: 5000 INJECTION INTRAVENOUS; SUBCUTANEOUS at 05:14

## 2017-09-08 RX ADMIN — SODIUM CHLORIDE 500 MILLILITER(S): 9 INJECTION INTRAMUSCULAR; INTRAVENOUS; SUBCUTANEOUS at 07:46

## 2017-09-08 RX ADMIN — SODIUM CHLORIDE 500 MILLILITER(S): 9 INJECTION INTRAMUSCULAR; INTRAVENOUS; SUBCUTANEOUS at 00:23

## 2017-09-08 RX ADMIN — Medication 1000 MILLIGRAM(S): at 12:06

## 2017-09-08 RX ADMIN — Medication 1000 UNIT(S): at 12:15

## 2017-09-08 RX ADMIN — Medication 50 GRAM(S): at 05:15

## 2017-09-08 RX ADMIN — Medication 1 GRAM(S): at 05:14

## 2017-09-08 RX ADMIN — INFLUENZA VIRUS VACCINE 0.5 MILLILITER(S): 15; 15; 15; 15 SUSPENSION INTRAMUSCULAR at 12:38

## 2017-09-08 NOTE — DISCHARGE NOTE ADULT - ADDITIONAL INSTRUCTIONS
follow up appointment with Josh Corcoran MD 10/02/2017 at 10:45 am in the EP clinic [facility] , come to entrance # 1 ,  parking for cardiology patient complimentary

## 2017-09-08 NOTE — PROVIDER CONTACT NOTE (OTHER) - ASSESSMENT
vital signs recheck, katerina transduced, check groin remains dry and intact and katerina site for any signs of bleeding

## 2017-09-08 NOTE — PROGRESS NOTE ADULT - PROBLEM SELECTOR PLAN 1
telemetry  K+>4, MG++>2  post procedure teaching done with patient and daughter  repeat cbc - slight trend down , combined with episode of hypotension  check ECHO official read  if no pericardial effusion Discontinue heparin and restart Eliquis  continue with carafate, protonix and mechanical soft diet x 1 month   follow up as out patient with Josh Corcoran MD in EP clinic on 10/2 at 10:45 am telemetry  K+>4, MG++>2  post procedure teaching done with patient and daughter  repeat cbc - slight trend down , combined with episode of hypotension  check ECHO official read  if no pericardial effusion Discontinue heparin and restart Eliquis  continue with carafate, protonix and mechanical soft diet x 1 month   follow up as out patient with Josh Corcoran MD in EP clinic on 10/2 at 10:45 am  [id number] telemetry  K+>4, MG++>2  post procedure teaching done with patient and daughter  repeat cbc - slight trend down , combined with episode of hypotension  check ECHO official read  if no pericardial effusion Discontinue heparin   Restart Eliquis on Sonu 9/10/17  continue with carafate, protonix and mechanical soft diet x 1 month   follow up as out patient with Josh Corcoran MD in EP clinic on 10/2 at 10:45 am  [id number]

## 2017-09-08 NOTE — PROVIDER CONTACT NOTE (OTHER) - SITUATION
Ella NBP reading of < 80/50. repeated several times. Recheck with the katerina. and with the same reading patient  [facility].

## 2017-09-08 NOTE — DISCHARGE NOTE ADULT - CARE PLAN
Principal Discharge DX:	Atrial fibrillation, persistent  Goal:	You are s/p Afib ablation; You will remain in normal sinus rhythm; You will not develop chest pain.  You will not develop swelling or bleeding on your right groin.  You will not develop fever or difficulty swallowing.  Instructions for follow-up, activity and diet:	Do not climb up stairs in 2 days.    No bathing in tub for 5 days.  Go to the nearest ED for swelling or bleeding on your right groin  Call your Electrophysiologist if you develop palpitation, chest pain, fever, or difficulty swallowing.  You will continue to take you Protonix and Carafate as prescribed along with your other medications  You will continue to stay on mechanical soft diet x 2 weeks

## 2017-09-08 NOTE — DISCHARGE NOTE ADULT - INSTRUCTIONS
No heavy lifting, strenuous activity, bending, straining or unnecessary stair climbing  for 2 weeks. No sex for 1 week.  No driving for 2 days. You may shower 24 hours following procedure but avoid baths and swimming for 1 week. Check groin site for bleeding and/or swelling daily following procedure. Call your doctor/cardiologist immediately should it occur or if you have increased/persistent pain at the site. Follow up with your cardiologist in 1- 2 weeks. You may call [facility] at [phone] or [phone] after office hours and weekends  with any questions or concerns following your procedure. Take medications as prescribed.

## 2017-09-08 NOTE — DISCHARGE NOTE ADULT - FINDINGS/TREATMENT
Limited TTE 9/8/17:  Conclusions:  Technically limited:  1. Small circumferential pericardial effusion.  No  echocardiographic evidence of pericardial tamponade.

## 2017-09-08 NOTE — PROGRESS NOTE ADULT - SUBJECTIVE AND OBJECTIVE BOX
Patient is a 62y old  Male who presents with a chief complaint of Afib ablation (08 Sep 2017 06:10)    HPI:  This is a 62 year old male with PMHx of HTN, HLD, Afib ( ON Eliquis, recently diagnosed w/ Loop recorder inserted on 06/2017 ), prolapsed MV, exercise induced asthma, undifferentiated connective tissue disease, Migraines. Pt endorses he had pre syncopal episode few times and fatigue, seen and evaluated by Dr. Emmanuel.  Presents here today for PST only to come back on the 9/7/17 for afib ablation. Currently asymptomatic, denies any chest pain, dyspnea, dizziness, palpitations, N&V, HA. (28 Aug 2017 09:33)    Overnight Event:    REVIEW OF SYSTEMS:  	    MEDICATIONS  (STANDING):  pantoprazole    Tablet 40 milliGRAM(s) Oral before breakfast  sucralfate 1 Gram(s) Oral every 6 hours  influenza   Vaccine 0.5 milliLiter(s) IntraMuscular once  finasteride 5 milliGRAM(s) Oral at bedtime  montelukast 10 milliGRAM(s) Oral daily  cholecalciferol 1000 Unit(s) Oral daily  heparin  Infusion.  Unit(s)/Hr (9 mL/Hr) IV Continuous <Continuous>  pravastatin 40 milliGRAM(s) Oral at bedtime  topiramate ER (Trokendi XR) 100 milliGRAM(s) 100 milliGRAM(s) Oral daily  sodium chloride 0.9% Bolus 250 milliLiter(s) IV Bolus once    MEDICATIONS  (PRN):  heparin  Injectable 4400 Unit(s) IV Push every 6 hours PRN For aPTT less than 40        PHYSICAL EXAM:  Vital Signs Last 24 Hrs  T(C): 36.7 (08 Sep 2017 04:00), Max: 37.1 (07 Sep 2017 18:23)  T(F): 98 (08 Sep 2017 04:00), Max: 98.8 (07 Sep 2017 18:23)  HR: 66 (08 Sep 2017 06:00) (66 - 79)  BP: 85/51 (08 Sep 2017 06:00) (71/52 - 111/71)  BP(mean): 62 (08 Sep 2017 06:00) (58 - 85)  RR: 16 (08 Sep 2017 06:00) (11 - 21)  SpO2: 98% (08 Sep 2017 06:00) (97% - 100%)  I&O's Summary    07 Sep 2017 07:01  -  08 Sep 2017 07:00  --------------------------------------------------------  IN: 1559 mL / OUT: 900 mL / NET: 659 mL        Appearance: Normal	  HEENT:   Normal oral mucosa, PERRL, EOMI	  Lymphatic: No lymphadenopathy  Cardiovascular: Normal S1 S2, No JVD, No murmurs, No edema  Respiratory: Lungs clear to auscultation	  Psychiatry: A & O x 3, Mood & affect appropriate  Gastrointestinal:  Soft, Non-tender, + BS	  Skin: No rashes, No ecchymoses, No cyanosis	  Neurologic: Non-focal  Extremities: Normal range of motion, No clubbing, cyanosis or edema  Vascular: Peripheral pulses palpable 2+ bilaterally    LABS:	 	                        11.7   7.1   )-----------( 138      ( 08 Sep 2017 04:16 )             32.9     Auto Eosinophil # x     / Auto Eosinophil % x     / Auto Neutrophil # x     / Auto Neutrophil % x     / BANDS % x                            12.9   7.5   )-----------( 140      ( 07 Sep 2017 19:12 )             36.5     Auto Eosinophil # x     / Auto Eosinophil % x     / Auto Neutrophil # x     / Auto Neutrophil % x     / BANDS % x        INR: 1.11 ratio (09-08 @ 04:16)  INR: 1.21 ratio (09-07 @ 21:24)  INR: 1.56 ratio (09-07 @ 19:12)    09-08    140  |  110<H>  |  20  ----------------------------<  144<H>  4.7   |  20<L>  |  1.05  09-07    145  |  111<H>  |  19  ----------------------------<  160<H>  3.7   |  21<L>  |  0.98    Ca    8.4      08 Sep 2017 04:16  Mg     1.9     09-08  Phos  2.8     09-08        proBNP:   Lipid Profile:   HgA1c:   TSH:     CARDIAC MARKERS:        TELEMETRY: 	    ECG:  	  RADIOLOGY:  OTHER: 	    PREVIOUS DIAGNOSTIC TESTING:    [ ] Echocardiogram:  [ ]  Catheterization:  [ ] Stress Test:  	  	  LAUREL Santo  Contact # [phone] Patient is a 62y old  Male who presents with a chief complaint of Afib ablation (08 Sep 2017 06:10)    HPI:  This is a 62 year old male with PMHx of HTN, HLD, Afib ( ON Eliquis, recently diagnosed w/ Loop recorder inserted on 06/2017 ), prolapsed MV, exercise induced asthma, undifferentiated connective tissue disease, Migraines. Pt endorses he had pre syncopal episode few times and fatigue, seen and evaluated by Dr. Emmanuel.  Presents here today for PST only to come back on the 9/7/17 for afib ablation. Currently asymptomatic, denies any chest pain, dyspnea, dizziness, palpitations, N&V, HA. (28 Aug 2017 09:33)    Overnight Event:  hypotension to 70's, TTE negative but trace pericardial effusion with response 750cc NS    REVIEW OF SYSTEMS: Complaining of some burning in chest post ablation  	    MEDICATIONS  (STANDING):  pantoprazole    Tablet 40 milliGRAM(s) Oral before breakfast  sucralfate 1 Gram(s) Oral every 6 hours  influenza   Vaccine 0.5 milliLiter(s) IntraMuscular once  finasteride 5 milliGRAM(s) Oral at bedtime  montelukast 10 milliGRAM(s) Oral daily  cholecalciferol 1000 Unit(s) Oral daily  heparin  Infusion.  Unit(s)/Hr (9 mL/Hr) IV Continuous <Continuous>  pravastatin 40 milliGRAM(s) Oral at bedtime  topiramate ER (Trokendi XR) 100 milliGRAM(s) 100 milliGRAM(s) Oral daily  sodium chloride 0.9% Bolus 250 milliLiter(s) IV Bolus once    MEDICATIONS  (PRN):  heparin  Injectable 4400 Unit(s) IV Push every 6 hours PRN For aPTT less than 40      PHYSICAL EXAM:  Vital Signs Last 24 Hrs  T(C): 36.7 (08 Sep 2017 04:00), Max: 37.1 (07 Sep 2017 18:23)  T(F): 98 (08 Sep 2017 04:00), Max: 98.8 (07 Sep 2017 18:23)  HR: 66 (08 Sep 2017 06:00) (66 - 79)  BP: 85/51 (08 Sep 2017 06:00) (71/52 - 111/71)  BP(mean): 62 (08 Sep 2017 06:00) (58 - 85)  RR: 16 (08 Sep 2017 06:00) (11 - 21)  SpO2: 98% (08 Sep 2017 06:00) (97% - 100%)  I&O's Summary    07 Sep 2017 07:01  -  08 Sep 2017 07:00  --------------------------------------------------------  IN: 1559 mL / OUT: 900 mL / NET: 659 mL      Appearance: Normal	  HEENT:   Normal oral mucosa, PERRL, EOMI	  Lymphatic: No lymphadenopathy  Cardiovascular: Normal S1 S2, No JVD, No murmurs, No edema  Respiratory: Lungs clear to auscultation	  Psychiatry: A & O x 3, Mood & affect appropriate  Gastrointestinal:  Soft, Non-tender, + BS	  Skin: No rashes, No ecchymoses, No cyanosis	  Neurologic: Non-focal  Extremities: Normal range of motion, No clubbing, cyanosis or edema  Vascular: Peripheral pulses palpable 2+ bilaterally    LABS:	 	                        11.7   7.1   )-----------( 138      ( 08 Sep 2017 04:16 )             32.9     Auto Eosinophil # x     / Auto Eosinophil % x     / Auto Neutrophil # x     / Auto Neutrophil % x     / BANDS % x                            12.9   7.5   )-----------( 140      ( 07 Sep 2017 19:12 )             36.5     Auto Eosinophil # x     / Auto Eosinophil % x     / Auto Neutrophil # x     / Auto Neutrophil % x     / BANDS % x        INR: 1.11 ratio (09-08 @ 04:16)  INR: 1.21 ratio (09-07 @ 21:24)  INR: 1.56 ratio (09-07 @ 19:12)    09-08    140  |  110<H>  |  20  ----------------------------<  144<H>  4.7   |  20<L>  |  1.05  09-07    145  |  111<H>  |  19  ----------------------------<  160<H>  3.7   |  21<L>  |  0.98    Ca    8.4      08 Sep 2017 04:16  Mg     1.9     09-08  Phos  2.8     09-08      TELEMETRY: NSR  ECG:  	    	  LAUREL Santo  Contact # [phone]

## 2017-09-08 NOTE — PROGRESS NOTE ADULT - SUBJECTIVE AND OBJECTIVE BOX
HPI: feeling well , episode hypotension overnight  MEDICATIONS  (STANDING):  pantoprazole    Tablet 40 milliGRAM(s) Oral before breakfast  sucralfate 1 Gram(s) Oral every 6 hours  influenza   Vaccine 0.5 milliLiter(s) IntraMuscular once  finasteride 5 milliGRAM(s) Oral at bedtime  montelukast 10 milliGRAM(s) Oral daily  cholecalciferol 1000 Unit(s) Oral daily  heparin  Infusion.  Unit(s)/Hr (9 mL/Hr) IV Continuous <Continuous>  pravastatin 40 milliGRAM(s) Oral at bedtime  topiramate ER (Trokendi XR) 100 milliGRAM(s) 100 milliGRAM(s) Oral daily    MEDICATIONS  (PRN):  heparin  Injectable 4400 Unit(s) IV Push every 6 hours PRN For aPTT less than 40    Allergies quinolines (Urticaria; Rash)    ROS:  Constitutional: Pt denies fever/chills  Neuro: denies dizziness, HA   CV: denies CP, palpitation, states heavy feeling chest with inspiration   Pulm: denies SOB, cough, wheezing   GI: denies abd pain/N/V/D  : denies dysuria, + urination   Musculoskeletal: denies joint pain or swelling, denies restricted motion  Skin: denies ulcers, bleeding, rash    Vital Signs Last 24 Hrs  T(C): 36.4 (08 Sep 2017 07:00), Max: 37.1 (07 Sep 2017 18:23)  T(F): 97.6 (08 Sep 2017 07:00), Max: 98.8 (07 Sep 2017 18:23)  HR: 74 (08 Sep 2017 10:54) (66 - 79)  BP: 104/62 (08 Sep 2017 10:54) (71/52 - 111/71)  BP(mean): 71 (08 Sep 2017 10:54) (58 - 85)  RR: 20 (08 Sep 2017 10:54) (11 - 22)  SpO2: 100% (08 Sep 2017 07:54) (97% - 100%)    Physical Exam:  Gen: pleasant OOB to chair no acute distress  Neurological: Alert & Oriented x 3,  no focal deficits  Respiratory: CTA B/L, No wheezing/crackles/rhonchi  Cardiovascular: (+) S1 & S2, RRR, no murmur no rubs  Gastrointestinal: ND NT  Extremities: No pedal edema, + DP feet warm  Skin: Right groin flat no hematoma , no ecchymosis, no bleeding    LABS:                        11.7   7.1   )-----------( 138      ( 08 Sep 2017 04:16 )             32.9     09-08    140  |  110<H>  |  20  ----------------------------<  144<H>  4.7   |  20<L>  |  1.05    Ca    8.4      08 Sep 2017 04:16  Phos  2.8     09-08  Mg     1.9     09-08      PT/INR - ( 08 Sep 2017 04:16 )   PT: 12.0 sec;   INR: 1.11 ratio         PTT - ( 08 Sep 2017 04:16 )  PTT:27.7 sec  9/8 TTE HPI:  MEDICATIONS  (STANDING):  pantoprazole    Tablet 40 milliGRAM(s) Oral before breakfast  sucralfate 1 Gram(s) Oral every 6 hours  influenza   Vaccine 0.5 milliLiter(s) IntraMuscular once  finasteride 5 milliGRAM(s) Oral at bedtime  montelukast 10 milliGRAM(s) Oral daily  cholecalciferol 1000 Unit(s) Oral daily  heparin  Infusion.  Unit(s)/Hr (9 mL/Hr) IV Continuous <Continuous>  pravastatin 40 milliGRAM(s) Oral at bedtime  topiramate ER (Trokendi XR) 100 milliGRAM(s) 100 milliGRAM(s) Oral daily    MEDICATIONS  (PRN):  heparin  Injectable 4400 Unit(s) IV Push every 6 hours PRN For aPTT less than 40    Allergies    quinolines (Urticaria; Rash)    Intolerances      ROS:  Constitutional: Pt denies fever/chills  Neuro: denies dizziness, HA   CV: denies CP, palpitation  Pulm: denies SOB, cough, wheezing   GI: denies abd pain/N/V/D  : denies dysuria  Musculoskeletal: denies joint pain or swelling, denies restricted motion  Skin: denies ulcers, bleeding, rash  Vital Signs Last 24 Hrs  T(C): 36.4 (08 Sep 2017 07:00), Max: 37.1 (07 Sep 2017 18:23)  T(F): 97.6 (08 Sep 2017 07:00), Max: 98.8 (07 Sep 2017 18:23)  HR: 74 (08 Sep 2017 10:54) (66 - 79)  BP: 104/62 (08 Sep 2017 10:54) (71/52 - 111/71)  BP(mean): 71 (08 Sep 2017 10:54) (58 - 85)  RR: 20 (08 Sep 2017 10:54) (11 - 22)  SpO2: 100% (08 Sep 2017 07:54) (97% - 100%)  Physical Exam:  Constitutional: well developed, well nourished,  and no acute distress    Neurological: Alert & Oriented x 3,  no focal deficits    HEENT:   Neck supple.    Respiratory: CTA B/L, No wheezing/crackles/rhonchi    Cardiovascular: (+) S1 & S2, RRR    Gastrointestinal: soft, NT, nondistended, (+) BS    Genitourinary: non distended bladder, voiding freely    Extremities: No pedal edema, No clubbing, No cyanosis    Skin:  normal skin color and pigmentation, no skin lesions          LABS:                        11.7   7.1   )-----------( 138      ( 08 Sep 2017 04:16 )             32.9     09-08    140  |  110<H>  |  20  ----------------------------<  144<H>  4.7   |  20<L>  |  1.05    Ca    8.4      08 Sep 2017 04:16  Phos  2.8     09-08  Mg     1.9     09-08      PT/INR - ( 08 Sep 2017 04:16 )   PT: 12.0 sec;   INR: 1.11 ratio         PTT - ( 08 Sep 2017 04:16 )  PTT:27.7 sec      RADIOLOGY & ADDITIONAL TESTS:  ECHO:pending report     TELE: SR 80's  EKG:SR 79 BPM

## 2017-09-08 NOTE — DISCHARGE NOTE ADULT - CARE PROVIDER_API CALL
Josh Corcoran), Cardiac Electrophysiology; Cardiovascular Disease  [address], [place] [id number]  Phone: [phone]  Fax: [phone]

## 2017-09-08 NOTE — DISCHARGE NOTE ADULT - HOSPITAL COURSE
This is a 62 year old male with PMHx of HTN, HLD, Afib (on Eliquis, recently diagnosed w/ Loop recorder inserted on 06/2017), prolapsed MV, exercise induced asthma, undifferentiated connective tissue disease, migraines. Pt endorses he had pre syncopal episode few times, now s/p Afib ablation.  Developed asymptomatic hypotension, given IV bolus total 500 cc with minimal response.

## 2017-09-08 NOTE — ANESTHESIA FOLLOW-UP NOTE - NSEVALATIONFT_GEN_ALL_CORE
Pt states had bleeding from his mouth yesterday which resolved. On exam small abrasion on inside of the lower gum, no bleeding. Per pt no pain or further bleeding. Pt encouraged to notify MD or RN if get worse.

## 2017-09-08 NOTE — DISCHARGE NOTE ADULT - OTHER SIGNIFICANT FINDINGS
*Resume Eliquis on Sonu 9/10/17  *Do not take Losartan until you follow-up with your  PCP on Monday.

## 2017-09-08 NOTE — DISCHARGE NOTE ADULT - PLAN OF CARE
You are s/p Afib ablation; You will remain in normal sinus rhythm; You will not develop chest pain.  You will not develop swelling or bleeding on your right groin.  You will not develop fever or difficulty swallowing. Do not climb up stairs in 2 days.    No bathing in tub for 5 days.  Go to the nearest ED for swelling or bleeding on your right groin  Call your Electrophysiologist if you develop palpitation, chest pain, fever, or difficulty swallowing.  You will continue to take you Protonix and Carafate as prescribed along with your other medications  You will continue to stay on mechanical soft diet x 2 weeks

## 2017-09-08 NOTE — PROGRESS NOTE ADULT - PROBLEM SELECTOR PLAN 1
S/P A. Fib ablation  -cbc with slight downtrend after 750cc NS  -will f/u TTE, if no pericardial effusion stop heparin and restart Eliquis  -continue with carafate, protonix and mechanical soft diet x 1 month

## 2017-09-08 NOTE — DISCHARGE NOTE ADULT - PATIENT PORTAL LINK FT
“You can access the [employer] Patient Portal, offered by [facility], by registering with the following website: [email or web address]”

## 2017-09-08 NOTE — DISCHARGE NOTE ADULT - VISION (WITH CORRECTIVE LENSES IF THE PATIENT USUALLY WEARS THEM):
eyeglasses/Partially impaired: cannot see medication labels or newsprint, but can see obstacles in path, and the surrounding layout; can count fingers at arm's length

## 2017-09-08 NOTE — DISCHARGE NOTE ADULT - MEDICATION SUMMARY - MEDICATIONS TO TAKE
I will START or STAY ON the medications listed below when I get home from the hospital:    freetext medication  -  -- 100 milligram(s) by mouth once a day  -- Indication: For Migraines    finasteride 5 mg oral tablet  -- 1 tab(s) by mouth once a day  -- Indication: For BPH    Imitrex 50 mg oral tablet  -- 1 tab(s) by mouth once, As Needed  -- Indication: For Migraines    losartan 25 mg oral tablet  -- 1 tab(s) by mouth once a day  -- Indication: For HTN (hypertension)    Eliquis 5 mg oral tablet  -- 1 tab(s) by mouth 2 times a day  -- Indication: For Atrial fibrillation, persistent    Trokendi  mg oral capsule, extended release  -- 1 cap(s) by mouth once a day  -- Indication: For Migraines    pravastatin 40 mg oral tablet  -- 1 tab(s) by mouth once a day  -- Indication: For HLD    Singulair 10 mg oral tablet  -- 1 tab(s) by mouth once a day  -- Indication: For Asthma    sucralfate 1 g oral tablet  -- 1 tab(s) by mouth every 6 hours  -- Indication: For GI prophylaxis    pantoprazole 40 mg oral delayed release tablet  -- 1 tab(s) by mouth once a day (before a meal)  -- Indication: For GI prophylaxis    Vitamin D3 1000 intl units oral tablet  -- 1 tab(s) by mouth once a day  -- Indication: For Supplement

## 2017-09-08 NOTE — PROGRESS NOTE ADULT - ASSESSMENT
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 MVP , symptomatic presyncopal,   Afib ( ON Eliquis, recently diagnosed w/ Loop recorder inserted on 06/2017 ) , s Pt endorses he had pre syncopal episode few times and fatigue, seen and evaluated by Dr. Emmanuel.  Now s/p Afib ablation 9/7/17
62 year old male with PMHx of HTN, HLD, Afib on Eliquis, symptomatic A. Fib  admitted for A. fib ablation.

## 2017-09-08 NOTE — PROVIDER CONTACT NOTE (OTHER) - ACTION/TREATMENT ORDERED:
above provider aware. Normal saline 20 cc bolus x1 give, Then another 20cc suresh saline bolus.given .  continue monitoring

## 2017-09-10 RX ORDER — APIXABAN 2.5 MG/1
1 TABLET, FILM COATED ORAL
Qty: 0 | Refills: 0 | DISCHARGE
Start: 2017-09-10

## 2017-09-11 RX ORDER — LOSARTAN POTASSIUM 100 MG/1
1 TABLET, FILM COATED ORAL
Qty: 0 | Refills: 0 | DISCHARGE
Start: 2017-09-11

## 2017-09-28 ENCOUNTER — APPOINTMENT (OUTPATIENT)
Dept: ELECTROPHYSIOLOGY | Facility: CLINIC | Age: 62
End: 2017-09-28
Payer: COMMERCIAL

## 2017-09-28 PROCEDURE — 93298 REM INTERROG DEV EVAL SCRMS: CPT

## 2017-10-02 ENCOUNTER — APPOINTMENT (OUTPATIENT)
Dept: ELECTROPHYSIOLOGY | Facility: CLINIC | Age: 62
End: 2017-10-02
Payer: COMMERCIAL

## 2017-10-02 ENCOUNTER — NON-APPOINTMENT (OUTPATIENT)
Age: 62
End: 2017-10-02

## 2017-10-02 VITALS
HEIGHT: 70 IN | OXYGEN SATURATION: 99 % | WEIGHT: 162 LBS | BODY MASS INDEX: 23.19 KG/M2 | HEART RATE: 87 BPM | DIASTOLIC BLOOD PRESSURE: 83 MMHG | SYSTOLIC BLOOD PRESSURE: 136 MMHG

## 2017-10-02 PROCEDURE — 99215 OFFICE O/P EST HI 40 MIN: CPT

## 2017-10-02 PROCEDURE — 93000 ELECTROCARDIOGRAM COMPLETE: CPT

## 2017-10-02 RX ORDER — TOPIRAMATE 50 MG/1
50 CAPSULE, EXTENDED RELEASE ORAL
Refills: 0 | Status: DISCONTINUED | COMMUNITY
End: 2017-10-02

## 2017-10-23 ENCOUNTER — APPOINTMENT (OUTPATIENT)
Dept: ELECTROPHYSIOLOGY | Facility: CLINIC | Age: 62
End: 2017-10-23
Payer: COMMERCIAL

## 2017-10-23 VITALS — DIASTOLIC BLOOD PRESSURE: 77 MMHG | SYSTOLIC BLOOD PRESSURE: 109 MMHG | HEART RATE: 86 BPM | OXYGEN SATURATION: 100 %

## 2017-10-23 PROCEDURE — 99214 OFFICE O/P EST MOD 30 MIN: CPT

## 2017-10-23 PROCEDURE — 93000 ELECTROCARDIOGRAM COMPLETE: CPT

## 2017-10-23 RX ORDER — LOSARTAN POTASSIUM 25 MG/1
25 TABLET, FILM COATED ORAL DAILY
Qty: 30 | Refills: 0 | Status: DISCONTINUED | COMMUNITY
End: 2017-10-23

## 2017-11-07 ENCOUNTER — APPOINTMENT (OUTPATIENT)
Dept: ELECTROPHYSIOLOGY | Facility: CLINIC | Age: 62
End: 2017-11-07
Payer: COMMERCIAL

## 2017-11-07 PROCEDURE — 93298 REM INTERROG DEV EVAL SCRMS: CPT

## 2017-12-12 ENCOUNTER — APPOINTMENT (OUTPATIENT)
Dept: ELECTROPHYSIOLOGY | Facility: CLINIC | Age: 62
End: 2017-12-12
Payer: COMMERCIAL

## 2017-12-12 ENCOUNTER — APPOINTMENT (OUTPATIENT)
Dept: PAIN MANAGEMENT | Facility: CLINIC | Age: 62
End: 2017-12-12
Payer: COMMERCIAL

## 2017-12-12 VITALS
WEIGHT: 163 LBS | SYSTOLIC BLOOD PRESSURE: 107 MMHG | HEART RATE: 89 BPM | HEIGHT: 70 IN | DIASTOLIC BLOOD PRESSURE: 74 MMHG | BODY MASS INDEX: 23.34 KG/M2

## 2017-12-12 PROCEDURE — 93298 REM INTERROG DEV EVAL SCRMS: CPT

## 2017-12-12 PROCEDURE — 64615 CHEMODENERV MUSC MIGRAINE: CPT

## 2017-12-12 PROCEDURE — 99213 OFFICE O/P EST LOW 20 MIN: CPT | Mod: 25

## 2018-01-08 ENCOUNTER — APPOINTMENT (OUTPATIENT)
Dept: ELECTROPHYSIOLOGY | Facility: CLINIC | Age: 63
End: 2018-01-08
Payer: COMMERCIAL

## 2018-01-08 ENCOUNTER — NON-APPOINTMENT (OUTPATIENT)
Age: 63
End: 2018-01-08

## 2018-01-08 PROCEDURE — 99214 OFFICE O/P EST MOD 30 MIN: CPT

## 2018-01-08 PROCEDURE — 93000 ELECTROCARDIOGRAM COMPLETE: CPT

## 2018-02-20 ENCOUNTER — APPOINTMENT (OUTPATIENT)
Dept: ELECTROPHYSIOLOGY | Facility: CLINIC | Age: 63
End: 2018-02-20
Payer: COMMERCIAL

## 2018-02-20 PROCEDURE — 93298 REM INTERROG DEV EVAL SCRMS: CPT

## 2018-03-29 ENCOUNTER — APPOINTMENT (OUTPATIENT)
Dept: ELECTROPHYSIOLOGY | Facility: CLINIC | Age: 63
End: 2018-03-29
Payer: COMMERCIAL

## 2018-03-29 PROCEDURE — 93298 REM INTERROG DEV EVAL SCRMS: CPT

## 2018-04-06 ENCOUNTER — APPOINTMENT (OUTPATIENT)
Dept: ELECTROPHYSIOLOGY | Facility: CLINIC | Age: 63
End: 2018-04-06
Payer: COMMERCIAL

## 2018-04-06 VITALS
DIASTOLIC BLOOD PRESSURE: 78 MMHG | SYSTOLIC BLOOD PRESSURE: 111 MMHG | WEIGHT: 168 LBS | BODY MASS INDEX: 24.11 KG/M2 | OXYGEN SATURATION: 100 % | HEART RATE: 80 BPM

## 2018-04-06 PROCEDURE — 99214 OFFICE O/P EST MOD 30 MIN: CPT

## 2018-04-06 PROCEDURE — 93000 ELECTROCARDIOGRAM COMPLETE: CPT

## 2018-04-06 RX ORDER — APIXABAN 5 MG/1
5 TABLET, FILM COATED ORAL
Qty: 180 | Refills: 1 | Status: DISCONTINUED | COMMUNITY
Start: 2017-10-23 | End: 2018-04-06

## 2018-04-10 ENCOUNTER — APPOINTMENT (OUTPATIENT)
Dept: PAIN MANAGEMENT | Facility: CLINIC | Age: 63
End: 2018-04-10
Payer: COMMERCIAL

## 2018-04-10 VITALS
WEIGHT: 168 LBS | BODY MASS INDEX: 24.05 KG/M2 | DIASTOLIC BLOOD PRESSURE: 70 MMHG | HEART RATE: 78 BPM | SYSTOLIC BLOOD PRESSURE: 107 MMHG | HEIGHT: 70 IN

## 2018-04-10 PROCEDURE — 99213 OFFICE O/P EST LOW 20 MIN: CPT

## 2018-05-09 ENCOUNTER — APPOINTMENT (OUTPATIENT)
Dept: ELECTROPHYSIOLOGY | Facility: CLINIC | Age: 63
End: 2018-05-09
Payer: COMMERCIAL

## 2018-05-09 PROCEDURE — 93298 REM INTERROG DEV EVAL SCRMS: CPT

## 2018-06-26 ENCOUNTER — APPOINTMENT (OUTPATIENT)
Dept: ELECTROPHYSIOLOGY | Facility: CLINIC | Age: 63
End: 2018-06-26
Payer: COMMERCIAL

## 2018-06-26 PROCEDURE — 93298 REM INTERROG DEV EVAL SCRMS: CPT

## 2018-06-27 ENCOUNTER — APPOINTMENT (OUTPATIENT)
Dept: PAIN MANAGEMENT | Facility: CLINIC | Age: 63
End: 2018-06-27
Payer: COMMERCIAL

## 2018-06-27 PROCEDURE — 99213 OFFICE O/P EST LOW 20 MIN: CPT | Mod: 25

## 2018-06-27 PROCEDURE — 64615 CHEMODENERV MUSC MIGRAINE: CPT

## 2018-08-14 PROBLEM — M35.9 SYSTEMIC INVOLVEMENT OF CONNECTIVE TISSUE, UNSPECIFIED: Chronic | Status: ACTIVE | Noted: 2017-06-16

## 2018-08-14 PROBLEM — J45.909 UNSPECIFIED ASTHMA, UNCOMPLICATED: Chronic | Status: ACTIVE | Noted: 2017-06-16

## 2018-08-14 PROBLEM — E78.5 HYPERLIPIDEMIA, UNSPECIFIED: Chronic | Status: ACTIVE | Noted: 2017-06-16

## 2018-08-14 PROBLEM — I10 ESSENTIAL (PRIMARY) HYPERTENSION: Chronic | Status: ACTIVE | Noted: 2017-06-16

## 2018-08-14 PROBLEM — G43.909 MIGRAINE, UNSPECIFIED, NOT INTRACTABLE, WITHOUT STATUS MIGRAINOSUS: Chronic | Status: ACTIVE | Noted: 2017-06-16

## 2018-08-28 ENCOUNTER — APPOINTMENT (OUTPATIENT)
Dept: ELECTROPHYSIOLOGY | Facility: CLINIC | Age: 63
End: 2018-08-28
Payer: COMMERCIAL

## 2018-08-28 PROCEDURE — XXXXX: CPT

## 2018-09-26 ENCOUNTER — APPOINTMENT (OUTPATIENT)
Dept: PAIN MANAGEMENT | Facility: CLINIC | Age: 63
End: 2018-09-26
Payer: COMMERCIAL

## 2018-09-26 VITALS
BODY MASS INDEX: 23.34 KG/M2 | WEIGHT: 163 LBS | HEART RATE: 77 BPM | DIASTOLIC BLOOD PRESSURE: 65 MMHG | SYSTOLIC BLOOD PRESSURE: 106 MMHG | HEIGHT: 70 IN

## 2018-09-26 DIAGNOSIS — G43.709 CHRONIC MIGRAINE W/OUT AURA, NOT INTRACTABLE, W/OUT STATUS MIGRAINOSUS: ICD-10-CM

## 2018-09-26 PROCEDURE — 64615 CHEMODENERV MUSC MIGRAINE: CPT

## 2018-09-26 PROCEDURE — 99213 OFFICE O/P EST LOW 20 MIN: CPT | Mod: 25

## 2018-10-08 ENCOUNTER — APPOINTMENT (OUTPATIENT)
Dept: ELECTROPHYSIOLOGY | Facility: CLINIC | Age: 63
End: 2018-10-08

## 2018-10-09 ENCOUNTER — APPOINTMENT (OUTPATIENT)
Dept: ELECTROPHYSIOLOGY | Facility: CLINIC | Age: 63
End: 2018-10-09
Payer: COMMERCIAL

## 2018-10-09 ENCOUNTER — NON-APPOINTMENT (OUTPATIENT)
Age: 63
End: 2018-10-09

## 2018-10-09 VITALS
HEART RATE: 62 BPM | BODY MASS INDEX: 23.39 KG/M2 | WEIGHT: 163 LBS | OXYGEN SATURATION: 92 % | DIASTOLIC BLOOD PRESSURE: 89 MMHG | SYSTOLIC BLOOD PRESSURE: 126 MMHG

## 2018-10-09 PROCEDURE — 93290 INTERROG DEV EVAL ICPMS IP: CPT

## 2018-10-09 PROCEDURE — 99215 OFFICE O/P EST HI 40 MIN: CPT

## 2018-11-13 ENCOUNTER — APPOINTMENT (OUTPATIENT)
Dept: ELECTROPHYSIOLOGY | Facility: CLINIC | Age: 63
End: 2018-11-13
Payer: COMMERCIAL

## 2018-11-13 PROCEDURE — XXXXX: CPT

## 2018-12-17 ENCOUNTER — APPOINTMENT (OUTPATIENT)
Dept: PAIN MANAGEMENT | Facility: CLINIC | Age: 63
End: 2018-12-17
Payer: COMMERCIAL

## 2018-12-17 VITALS
BODY MASS INDEX: 2.29 KG/M2 | DIASTOLIC BLOOD PRESSURE: 73 MMHG | WEIGHT: 16 LBS | SYSTOLIC BLOOD PRESSURE: 107 MMHG | HEIGHT: 70 IN | HEART RATE: 71 BPM

## 2018-12-17 PROCEDURE — 64615 CHEMODENERV MUSC MIGRAINE: CPT

## 2018-12-17 PROCEDURE — 99213 OFFICE O/P EST LOW 20 MIN: CPT | Mod: 25

## 2018-12-18 ENCOUNTER — APPOINTMENT (OUTPATIENT)
Dept: ELECTROPHYSIOLOGY | Facility: CLINIC | Age: 63
End: 2018-12-18
Payer: COMMERCIAL

## 2018-12-18 PROCEDURE — XXXXX: CPT

## 2019-01-22 ENCOUNTER — APPOINTMENT (OUTPATIENT)
Dept: ELECTROPHYSIOLOGY | Facility: CLINIC | Age: 64
End: 2019-01-22

## 2019-02-26 ENCOUNTER — APPOINTMENT (OUTPATIENT)
Dept: ELECTROPHYSIOLOGY | Facility: CLINIC | Age: 64
End: 2019-02-26
Payer: COMMERCIAL

## 2019-02-26 ENCOUNTER — NON-APPOINTMENT (OUTPATIENT)
Age: 64
End: 2019-02-26

## 2019-02-26 VITALS
DIASTOLIC BLOOD PRESSURE: 79 MMHG | BODY MASS INDEX: 23.62 KG/M2 | SYSTOLIC BLOOD PRESSURE: 110 MMHG | HEART RATE: 68 BPM | WEIGHT: 165 LBS | OXYGEN SATURATION: 99 % | HEIGHT: 70 IN

## 2019-02-26 PROCEDURE — 99213 OFFICE O/P EST LOW 20 MIN: CPT

## 2019-02-26 PROCEDURE — 93285 PRGRMG DEV EVAL SCRMS IP: CPT

## 2019-02-26 NOTE — DISCUSSION/SUMMARY
[FreeTextEntry1] : 63 year old male with history of paroxysmal atrial fibrillation in the setting of hypertension, s/p AFib ablation 9/2018. He remains off of oral AC based on his active lifestyle and avid cyclist as well as shared decision making with his prior EP MD.  ILR interrogation today without AF episodes. Follow up remote ILR check monthly and in office ILR interrogation in 3-4 months.

## 2019-02-26 NOTE — REVIEW OF SYSTEMS
[Eyeglasses] : currently wearing eyeglasses [see HPI] : see HPI [Joint Pain] : joint pain [Negative] : Constitutional

## 2019-02-26 NOTE — HISTORY OF PRESENT ILLNESS
[FreeTextEntry1] : Denies c/o CP, palpitations, dizziness, lightheadedness or SOB.  Patient states he has been feeling well.  Admits to exercise twice weekly with indoor bicycling.

## 2019-03-19 ENCOUNTER — APPOINTMENT (OUTPATIENT)
Dept: PAIN MANAGEMENT | Facility: CLINIC | Age: 64
End: 2019-03-19
Payer: COMMERCIAL

## 2019-03-19 VITALS
BODY MASS INDEX: 23.62 KG/M2 | HEIGHT: 70 IN | SYSTOLIC BLOOD PRESSURE: 97 MMHG | HEART RATE: 88 BPM | DIASTOLIC BLOOD PRESSURE: 64 MMHG | WEIGHT: 165 LBS

## 2019-03-19 PROCEDURE — 64615 CHEMODENERV MUSC MIGRAINE: CPT

## 2019-03-19 PROCEDURE — 99213 OFFICE O/P EST LOW 20 MIN: CPT | Mod: 25

## 2019-04-01 ENCOUNTER — APPOINTMENT (OUTPATIENT)
Dept: ELECTROPHYSIOLOGY | Facility: HOSPITAL | Age: 64
End: 2019-04-01
Payer: COMMERCIAL

## 2019-04-01 PROCEDURE — 93298 REM INTERROG DEV EVAL SCRMS: CPT

## 2019-04-01 PROCEDURE — 93299: CPT

## 2019-05-02 ENCOUNTER — APPOINTMENT (OUTPATIENT)
Dept: ELECTROPHYSIOLOGY | Facility: HOSPITAL | Age: 64
End: 2019-05-02
Payer: COMMERCIAL

## 2019-05-02 PROCEDURE — 93298 REM INTERROG DEV EVAL SCRMS: CPT

## 2019-05-02 PROCEDURE — 93299: CPT

## 2019-06-20 NOTE — PHYSICAL EXAM
[General Appearance - Alert] : alert [General Appearance - Well Nourished] : well nourished [General Appearance - In No Acute Distress] : in no acute distress [Oriented To Time, Place, And Person] : oriented to person, place, and time [Affect] : the affect was normal [Mood] : the mood was normal [Person] : oriented to person [Place] : oriented to place [Time] : oriented to time [Short Term Intact] : short term memory intact [Remote Intact] : remote memory intact [Registration Intact] : recent registration memory intact [Span Intact] : the attention span was normal [Concentration Intact] : normal concentrating ability [Visual Intact] : visual attention was ~T not ~L decreased [Fluency] : fluency intact [Comprehension] : comprehension intact [Cranial Nerves Oculomotor (III)] : extraocular motion intact [Cranial Nerves Facial (VII)] : face symmetrical [Cranial Nerves Trigeminal (V)] : facial sensation intact symmetrically [Cranial Nerves Vestibulocochlear (VIII)] : hearing was intact bilaterally [Cranial Nerves Accessory (XI - Cranial And Spinal)] : head turning and shoulder shrug symmetric [Motor Tone] : muscle tone was normal in all four extremities [Motor Strength] : muscle strength was normal in all four extremities [Involuntary Movements] : no involuntary movements were seen [Balance] : balance was intact [Sclera] : the sclera and conjunctiva were normal [Outer Ear] : the ears and nose were normal in appearance [Hearing Threshold Finger Rub Not [place]] : hearing was normal [Neck Appearance] : the appearance of the neck was normal [Nail Clubbing] : no clubbing  or cyanosis of the fingernails [Abnormal Walk] : normal gait [Skin Color & Pigmentation] : normal skin color and pigmentation [Musculoskeletal - Swelling] : no joint swelling seen [] : no rash [Skin Lesions] : no skin lesions

## 2019-06-26 NOTE — PROCEDURE
[Chronic migraine] : Chronic migraine [Consent Signed] : consent signed [Continue Current Treatment] : continue current treatment [BOTOX 200 Units] : BOTOX 200 units; 5 units per muscle site.  procerus x 1, corragator x 2, frontalis x 4, temporalis x 8, occipitalis x 6, cervical  paraspinal x 4 and trapezius x 6 [Adverse Effects] : no adverse effects [FreeTextEntry1] : Consent signed. No complications. \par 110 units 4 cc normal saline\par 90 units wasted\par Injections are provided following the pain protocol and not the protocol that is FDA approved. Patient is fully aware of that. He also has been made aware on multiple visits about the risk of atrophy while injecting into these select muscle. He fully understands the potential risks, ie permanent muscle tissue atrophy. Injections using 30 gauge needle into multiple site into the right and left trap, right and left SCM , bilateral occipitalis and right masseter region injecting no more than 5 units into each site.

## 2019-06-26 NOTE — ASSESSMENT
[FreeTextEntry1] : 62 y/o male patient with chronic migraines presents today for Botox treatment. He responded well to the treatment today and reports no immediate AE. He will return in 3 months or sooner if clinically indicated.

## 2019-06-26 NOTE — REVIEW OF SYSTEMS
[Eyeglasses] : currently wearing eyeglasses [see HPI] : see HPI [Joint Pain] : joint pain [Negative] : Heme/Lymph

## 2019-06-26 NOTE — HISTORY OF PRESENT ILLNESS
[FreeTextEntry1] : Since last visit, patient tried to hold off from using BOTOX to determine if he needed this form of therapy. No new medical problems. \par Over the past one month, the head and neck pain returned.

## 2019-06-27 ENCOUNTER — APPOINTMENT (OUTPATIENT)
Dept: PAIN MANAGEMENT | Facility: CLINIC | Age: 64
End: 2019-06-27
Payer: COMMERCIAL

## 2019-06-27 ENCOUNTER — APPOINTMENT (OUTPATIENT)
Dept: ELECTROPHYSIOLOGY | Facility: CLINIC | Age: 64
End: 2019-06-27
Payer: COMMERCIAL

## 2019-06-27 ENCOUNTER — NON-APPOINTMENT (OUTPATIENT)
Age: 64
End: 2019-06-27

## 2019-06-27 VITALS
SYSTOLIC BLOOD PRESSURE: 120 MMHG | HEIGHT: 70 IN | WEIGHT: 168 LBS | OXYGEN SATURATION: 100 % | DIASTOLIC BLOOD PRESSURE: 84 MMHG | BODY MASS INDEX: 24.05 KG/M2 | HEART RATE: 66 BPM

## 2019-06-27 VITALS
BODY MASS INDEX: 24.05 KG/M2 | WEIGHT: 168 LBS | DIASTOLIC BLOOD PRESSURE: 79 MMHG | HEIGHT: 70 IN | SYSTOLIC BLOOD PRESSURE: 114 MMHG | HEART RATE: 86 BPM

## 2019-06-27 PROCEDURE — 64615 CHEMODENERV MUSC MIGRAINE: CPT

## 2019-06-27 PROCEDURE — 99213 OFFICE O/P EST LOW 20 MIN: CPT | Mod: 25

## 2019-06-27 PROCEDURE — 93290 INTERROG DEV EVAL ICPMS IP: CPT

## 2019-06-27 PROCEDURE — 99214 OFFICE O/P EST MOD 30 MIN: CPT

## 2019-06-27 RX ORDER — ERENUMAB-AOOE 70 MG/ML
70 INJECTION SUBCUTANEOUS
Qty: 9 | Refills: 0 | Status: DISCONTINUED | COMMUNITY
Start: 2018-09-26 | End: 2019-06-27

## 2019-06-27 NOTE — DISCUSSION/SUMMARY
[FreeTextEntry1] : 64 year old male with history of paroxysmal atrial fibrillation in the setting of hypertension, s/p AFib ablation 9/2018. He remains off of oral AC based on his active lifestyle and avid cyclist as well as shared decision making with his prior EP MD.  ILR interrogation today without AF episodes. Follow up remote ILR check monthly and in office ILR interrogation in 3-4 months.

## 2019-06-27 NOTE — HISTORY OF PRESENT ILLNESS
[FreeTextEntry1] : He has been riding 100 miles/week with no symptoms.  No chest pain. No shortness of breath. NO lightheadedness/dizziness.  No palpitations.

## 2019-07-02 NOTE — PHYSICAL EXAM
[General Appearance - Alert] : alert [General Appearance - In No Acute Distress] : in no acute distress [General Appearance - Well Nourished] : well nourished [Mood] : the mood was normal [Oriented To Time, Place, And Person] : oriented to person, place, and time [Affect] : the affect was normal [Person] : oriented to person [Place] : oriented to place [Short Term Intact] : short term memory intact [Time] : oriented to time [Remote Intact] : remote memory intact [Registration Intact] : recent registration memory intact [Span Intact] : the attention span was normal [Concentration Intact] : normal concentrating ability [Visual Intact] : visual attention was ~T not ~L decreased [Fluency] : fluency intact [Cranial Nerves Oculomotor (III)] : extraocular motion intact [Comprehension] : comprehension intact [Cranial Nerves Facial (VII)] : face symmetrical [Cranial Nerves Trigeminal (V)] : facial sensation intact symmetrically [Cranial Nerves Vestibulocochlear (VIII)] : hearing was intact bilaterally [Cranial Nerves Accessory (XI - Cranial And Spinal)] : head turning and shoulder shrug symmetric [Motor Tone] : muscle tone was normal in all four extremities [Involuntary Movements] : no involuntary movements were seen [Motor Strength] : muscle strength was normal in all four extremities [Balance] : balance was intact [Outer Ear] : the ears and nose were normal in appearance [Sclera] : the sclera and conjunctiva were normal [Neck Appearance] : the appearance of the neck was normal [Hearing Threshold Finger Rub Not [place]] : hearing was normal [Abnormal Walk] : normal gait [Nail Clubbing] : no clubbing  or cyanosis of the fingernails [Musculoskeletal - Swelling] : no joint swelling seen [] : no rash [Skin Color & Pigmentation] : normal skin color and pigmentation [Skin Lesions] : no skin lesions

## 2019-07-02 NOTE — PHYSICAL EXAM
[General Appearance - Alert] : alert [General Appearance - In No Acute Distress] : in no acute distress [General Appearance - Well Nourished] : well nourished [Affect] : the affect was normal [Oriented To Time, Place, And Person] : oriented to person, place, and time [Mood] : the mood was normal [Person] : oriented to person [Short Term Intact] : short term memory intact [Place] : oriented to place [Time] : oriented to time [Remote Intact] : remote memory intact [Registration Intact] : recent registration memory intact [Concentration Intact] : normal concentrating ability [Span Intact] : the attention span was normal [Fluency] : fluency intact [Visual Intact] : visual attention was ~T not ~L decreased [Cranial Nerves Oculomotor (III)] : extraocular motion intact [Comprehension] : comprehension intact [Cranial Nerves Trigeminal (V)] : facial sensation intact symmetrically [Cranial Nerves Facial (VII)] : face symmetrical [Cranial Nerves Accessory (XI - Cranial And Spinal)] : head turning and shoulder shrug symmetric [Cranial Nerves Vestibulocochlear (VIII)] : hearing was intact bilaterally [Motor Tone] : muscle tone was normal in all four extremities [Motor Strength] : muscle strength was normal in all four extremities [Involuntary Movements] : no involuntary movements were seen [Balance] : balance was intact [Sclera] : the sclera and conjunctiva were normal [Outer Ear] : the ears and nose were normal in appearance [Neck Appearance] : the appearance of the neck was normal [Hearing Threshold Finger Rub Not [place]] : hearing was normal [Nail Clubbing] : no clubbing  or cyanosis of the fingernails [Abnormal Walk] : normal gait [Musculoskeletal - Swelling] : no joint swelling seen [Skin Color & Pigmentation] : normal skin color and pigmentation [] : no rash [Skin Lesions] : no skin lesions

## 2019-07-02 NOTE — PHYSICAL EXAM
[General Appearance - In No Acute Distress] : in no acute distress [General Appearance - Alert] : alert [General Appearance - Well Nourished] : well nourished [Affect] : the affect was normal [Oriented To Time, Place, And Person] : oriented to person, place, and time [Mood] : the mood was normal [Person] : oriented to person [Place] : oriented to place [Time] : oriented to time [Short Term Intact] : short term memory intact [Remote Intact] : remote memory intact [Registration Intact] : recent registration memory intact [Concentration Intact] : normal concentrating ability [Span Intact] : the attention span was normal [Visual Intact] : visual attention was ~T not ~L decreased [Fluency] : fluency intact [Cranial Nerves Oculomotor (III)] : extraocular motion intact [Comprehension] : comprehension intact [Cranial Nerves Facial (VII)] : face symmetrical [Cranial Nerves Trigeminal (V)] : facial sensation intact symmetrically [Cranial Nerves Vestibulocochlear (VIII)] : hearing was intact bilaterally [Cranial Nerves Accessory (XI - Cranial And Spinal)] : head turning and shoulder shrug symmetric [Motor Tone] : muscle tone was normal in all four extremities [Involuntary Movements] : no involuntary movements were seen [Motor Strength] : muscle strength was normal in all four extremities [Balance] : balance was intact [Outer Ear] : the ears and nose were normal in appearance [Sclera] : the sclera and conjunctiva were normal [Neck Appearance] : the appearance of the neck was normal [Hearing Threshold Finger Rub Not [place]] : hearing was normal [Abnormal Walk] : normal gait [Nail Clubbing] : no clubbing  or cyanosis of the fingernails [Musculoskeletal - Swelling] : no joint swelling seen [Skin Color & Pigmentation] : normal skin color and pigmentation [] : no rash [Skin Lesions] : no skin lesions

## 2019-07-02 NOTE — HISTORY OF PRESENT ILLNESS
[FreeTextEntry1] : Patient reports having a decrease in headaches since the third injection of Emgality. He co some abdominal discomfort. He is taking Miralax. Decrease in need of Imitrex using it 2 times per week from 4-5 per week.

## 2019-07-29 ENCOUNTER — APPOINTMENT (OUTPATIENT)
Dept: ELECTROPHYSIOLOGY | Facility: CLINIC | Age: 64
End: 2019-07-29
Payer: COMMERCIAL

## 2019-07-29 PROCEDURE — 93299: CPT

## 2019-07-29 PROCEDURE — 93298 REM INTERROG DEV EVAL SCRMS: CPT

## 2019-08-13 ENCOUNTER — MESSAGE (OUTPATIENT)
Age: 64
End: 2019-08-13

## 2019-08-29 ENCOUNTER — APPOINTMENT (OUTPATIENT)
Dept: ELECTROPHYSIOLOGY | Facility: CLINIC | Age: 64
End: 2019-08-29
Payer: COMMERCIAL

## 2019-08-29 PROCEDURE — 93299: CPT

## 2019-08-29 PROCEDURE — 93298 REM INTERROG DEV EVAL SCRMS: CPT

## 2019-09-23 ENCOUNTER — APPOINTMENT (OUTPATIENT)
Dept: PAIN MANAGEMENT | Facility: CLINIC | Age: 64
End: 2019-09-23
Payer: COMMERCIAL

## 2019-09-23 PROCEDURE — 99213 OFFICE O/P EST LOW 20 MIN: CPT

## 2019-09-25 NOTE — PHYSICAL EXAM
[Affect] : the affect was normal [General Appearance - Alert] : alert [Person] : oriented to person [Place] : oriented to place [Time] : oriented to time [Sclera] : the sclera and conjunctiva were normal [Outer Ear] : the ears and nose were normal in appearance [Neck Appearance] : the appearance of the neck was normal [] : no respiratory distress

## 2019-09-25 NOTE — HISTORY OF PRESENT ILLNESS
[FreeTextEntry1] : Patient on trokendi  and CGRP. He has asked me to continue writing trokendi and Imitrex for his retiring neurologist. No difference from BOTOX and wants to dc this treatment.

## 2019-09-25 NOTE — DISCUSSION/SUMMARY
[FreeTextEntry1] : Chronic migraine. Doing better on trokendi and Emgality. Will stop BOTOX and continue with current treatment plan. I agree to continue writing these meds since his other neurologist is retiring. \par FU 6-12 months.

## 2019-10-22 ENCOUNTER — APPOINTMENT (OUTPATIENT)
Dept: ELECTROPHYSIOLOGY | Facility: CLINIC | Age: 64
End: 2019-10-22
Payer: COMMERCIAL

## 2019-10-22 ENCOUNTER — NON-APPOINTMENT (OUTPATIENT)
Age: 64
End: 2019-10-22

## 2019-10-22 VITALS — HEART RATE: 70 BPM | SYSTOLIC BLOOD PRESSURE: 112 MMHG | DIASTOLIC BLOOD PRESSURE: 74 MMHG

## 2019-10-22 PROCEDURE — 93285 PRGRMG DEV EVAL SCRMS IP: CPT

## 2019-10-22 PROCEDURE — 99213 OFFICE O/P EST LOW 20 MIN: CPT

## 2019-10-22 PROCEDURE — 93000 ELECTROCARDIOGRAM COMPLETE: CPT | Mod: 59

## 2019-10-22 NOTE — PHYSICAL EXAM
[Normal Conjunctiva] : the conjunctiva exhibited no abnormalities [General Appearance - Well Developed] : well developed [General Appearance - Well Nourished] : well nourished [No Oral Pallor] : no oral pallor [No Oral Cyanosis] : no oral cyanosis [Normal Jugular Venous V Waves Present] : normal jugular venous V waves present [Respiration, Rhythm And Depth] : normal respiratory rhythm and effort [Exaggerated Use Of Accessory Muscles For Inspiration] : no accessory muscle use [Heart Rate And Rhythm] : heart rate and rhythm were normal [Auscultation Breath Sounds / Voice Sounds] : lungs were clear to auscultation bilaterally [Arterial Pulses Normal] : the arterial pulses were normal [Heart Sounds] : normal S1 and S2 [Murmurs] : no murmurs present [Bowel Sounds] : normal bowel sounds [Abdomen Tenderness] : non-tender [Abdomen Soft] : soft [Abnormal Walk] : normal gait [Nail Clubbing] : no clubbing of the fingernails [Gait - Sufficient For Exercise Testing] : the gait was sufficient for exercise testing [Cyanosis, Localized] : no localized cyanosis [Skin Color & Pigmentation] : normal skin color and pigmentation [] : no rash [No Anxiety] : not feeling anxious [Oriented To Time, Place, And Person] : oriented to person, place, and time

## 2019-10-22 NOTE — DISCUSSION/SUMMARY
[FreeTextEntry1] : Doing well with a low burden of arrhythmia. Continue current therapy.  Follow up in 4 months.

## 2019-10-22 NOTE — HISTORY OF PRESENT ILLNESS
[FreeTextEntry1] : Continues to ride > 100 miles/week. No palpitations. Exercise tolerance is good. No complaints.

## 2019-10-22 NOTE — REVIEW OF SYSTEMS
[Eyeglasses] : currently wearing eyeglasses [Joint Pain] : joint pain [see HPI] : see HPI [Negative] : Heme/Lymph [Numbness] : numbness

## 2019-11-22 ENCOUNTER — APPOINTMENT (OUTPATIENT)
Dept: ELECTROPHYSIOLOGY | Facility: CLINIC | Age: 64
End: 2019-11-22
Payer: COMMERCIAL

## 2019-11-22 PROCEDURE — 93298 REM INTERROG DEV EVAL SCRMS: CPT

## 2019-11-22 PROCEDURE — 93299: CPT

## 2019-12-23 ENCOUNTER — APPOINTMENT (OUTPATIENT)
Dept: ELECTROPHYSIOLOGY | Facility: CLINIC | Age: 64
End: 2019-12-23
Payer: COMMERCIAL

## 2019-12-23 PROCEDURE — 93298 REM INTERROG DEV EVAL SCRMS: CPT

## 2019-12-23 PROCEDURE — 93299: CPT

## 2020-01-23 ENCOUNTER — APPOINTMENT (OUTPATIENT)
Dept: ELECTROPHYSIOLOGY | Facility: CLINIC | Age: 65
End: 2020-01-23
Payer: COMMERCIAL

## 2020-01-23 PROCEDURE — 93298 REM INTERROG DEV EVAL SCRMS: CPT

## 2020-01-23 PROCEDURE — G2066: CPT

## 2020-02-25 ENCOUNTER — NON-APPOINTMENT (OUTPATIENT)
Age: 65
End: 2020-02-25

## 2020-02-25 ENCOUNTER — APPOINTMENT (OUTPATIENT)
Dept: ELECTROPHYSIOLOGY | Facility: CLINIC | Age: 65
End: 2020-02-25
Payer: COMMERCIAL

## 2020-02-25 VITALS
WEIGHT: 10.5 LBS | DIASTOLIC BLOOD PRESSURE: 78 MMHG | BODY MASS INDEX: 1.5 KG/M2 | SYSTOLIC BLOOD PRESSURE: 113 MMHG | OXYGEN SATURATION: 97 % | HEART RATE: 70 BPM | HEIGHT: 70 IN

## 2020-02-25 PROCEDURE — 99213 OFFICE O/P EST LOW 20 MIN: CPT

## 2020-02-25 PROCEDURE — 93285 PRGRMG DEV EVAL SCRMS IP: CPT

## 2020-02-25 PROCEDURE — 93000 ELECTROCARDIOGRAM COMPLETE: CPT | Mod: 59

## 2020-02-25 RX ORDER — GALCANEZUMAB 120 MG/ML
120 INJECTION, SOLUTION SUBCUTANEOUS
Qty: 1 | Refills: 2 | Status: DISCONTINUED | COMMUNITY
Start: 2019-03-19 | End: 2020-02-25

## 2020-02-25 NOTE — REVIEW OF SYSTEMS
[Eyeglasses] : currently wearing eyeglasses [Joint Pain] : joint pain [see HPI] : see HPI [Numbness] : numbness [Negative] : Heme/Lymph

## 2020-02-25 NOTE — DISCUSSION/SUMMARY
[FreeTextEntry1] : s/p PVI, SVC isolation. [place] roof line 9/7/2018 for symptomatic paroxysmal atrial fibrillation in the setting of hypertension. ILR shows no AF and no pauses.  His nearsyncopal episode was likely BP related.  Lifestyle modifications were reviewed.  Follow up in 4 months.

## 2020-02-25 NOTE — HISTORY OF PRESENT ILLNESS
[FreeTextEntry1] : No complaints. NO chest pain. No shortness of breath.  He has not started to ride yet, although he did one ride on a nice day this month.  1 week ago he had an episode of nearsyncope on getting to the top of a flight of stairs.  NO palpitations.  He did not use the patient activator.  He admits to poor hydration that day.  It occurred at 2 pm that afternoon. He did eat lunch.

## 2020-02-25 NOTE — PHYSICAL EXAM
[General Appearance - Well Developed] : well developed [No Oral Cyanosis] : no oral cyanosis [No Oral Pallor] : no oral pallor [General Appearance - Well Nourished] : well nourished [Respiration, Rhythm And Depth] : normal respiratory rhythm and effort [Exaggerated Use Of Accessory Muscles For Inspiration] : no accessory muscle use [Heart Rate And Rhythm] : heart rate and rhythm were normal [Auscultation Breath Sounds / Voice Sounds] : lungs were clear to auscultation bilaterally [Murmurs] : no murmurs present [Heart Sounds] : normal S1 and S2 [Arterial Pulses Normal] : the arterial pulses were normal [Bowel Sounds] : normal bowel sounds [Abdomen Soft] : soft [Abdomen Tenderness] : non-tender [Gait - Sufficient For Exercise Testing] : the gait was sufficient for exercise testing [Nail Clubbing] : no clubbing of the fingernails [Abnormal Walk] : normal gait [] : no rash [Cyanosis, Localized] : no localized cyanosis [Skin Color & Pigmentation] : normal skin color and pigmentation [No Anxiety] : not feeling anxious [Oriented To Time, Place, And Person] : oriented to person, place, and time

## 2020-03-27 ENCOUNTER — APPOINTMENT (OUTPATIENT)
Dept: ELECTROPHYSIOLOGY | Facility: CLINIC | Age: 65
End: 2020-03-27
Payer: COMMERCIAL

## 2020-03-27 PROCEDURE — G2066: CPT

## 2020-03-27 PROCEDURE — 93298 REM INTERROG DEV EVAL SCRMS: CPT

## 2020-04-27 ENCOUNTER — APPOINTMENT (OUTPATIENT)
Dept: ELECTROPHYSIOLOGY | Facility: CLINIC | Age: 65
End: 2020-04-27
Payer: COMMERCIAL

## 2020-04-27 PROCEDURE — 93298 REM INTERROG DEV EVAL SCRMS: CPT

## 2020-04-27 PROCEDURE — G2066: CPT

## 2020-05-28 ENCOUNTER — APPOINTMENT (OUTPATIENT)
Dept: ELECTROPHYSIOLOGY | Facility: CLINIC | Age: 65
End: 2020-05-28
Payer: COMMERCIAL

## 2020-05-28 PROCEDURE — 93298 REM INTERROG DEV EVAL SCRMS: CPT

## 2020-05-28 PROCEDURE — G2066: CPT

## 2020-06-29 ENCOUNTER — APPOINTMENT (OUTPATIENT)
Dept: ELECTROPHYSIOLOGY | Facility: CLINIC | Age: 65
End: 2020-06-29
Payer: COMMERCIAL

## 2020-06-29 PROCEDURE — G2066: CPT

## 2020-06-29 PROCEDURE — 93298 REM INTERROG DEV EVAL SCRMS: CPT

## 2020-06-30 ENCOUNTER — APPOINTMENT (OUTPATIENT)
Dept: ELECTROPHYSIOLOGY | Facility: CLINIC | Age: 65
End: 2020-06-30
Payer: COMMERCIAL

## 2020-06-30 PROCEDURE — 99213 OFFICE O/P EST LOW 20 MIN: CPT | Mod: 95

## 2020-06-30 NOTE — HISTORY OF PRESENT ILLNESS
[FreeTextEntry1] : Telehealth visit in the setting of COVID 19:\par \par No complaints.  No palpitations.  No chest pain. No shortness of breath. No lightheadedness/dizziness.  He has been riding 100 -110 miles/weekend with no complaints.  He admits that he has not been pushing himself as he is riding in a smaller group because of COVID.

## 2020-06-30 NOTE — REVIEW OF SYSTEMS
[Eyeglasses] : currently wearing eyeglasses [see HPI] : see HPI [Numbness] : numbness [Joint Pain] : joint pain [Negative] : Psychiatric

## 2020-06-30 NOTE — DISCUSSION/SUMMARY
[FreeTextEntry1] : Doing well post ablation.  No symptomatic or asymptomatic arrhythmia (on ILR- transmission today with no events).  Continue current therapy.  Follow up in 4 months.

## 2020-07-02 ENCOUNTER — APPOINTMENT (OUTPATIENT)
Dept: ELECTROPHYSIOLOGY | Facility: CLINIC | Age: 65
End: 2020-07-02

## 2020-08-03 ENCOUNTER — APPOINTMENT (OUTPATIENT)
Dept: ELECTROPHYSIOLOGY | Facility: CLINIC | Age: 65
End: 2020-08-03
Payer: COMMERCIAL

## 2020-08-03 PROCEDURE — G2066: CPT

## 2020-08-03 PROCEDURE — 93298 REM INTERROG DEV EVAL SCRMS: CPT

## 2020-09-04 ENCOUNTER — APPOINTMENT (OUTPATIENT)
Dept: ELECTROPHYSIOLOGY | Facility: CLINIC | Age: 65
End: 2020-09-04
Payer: COMMERCIAL

## 2020-09-04 PROCEDURE — 93298 REM INTERROG DEV EVAL SCRMS: CPT

## 2020-09-04 PROCEDURE — G2066: CPT

## 2020-09-05 ENCOUNTER — APPOINTMENT (OUTPATIENT)
Dept: MRI IMAGING | Facility: HOSPITAL | Age: 65
End: 2020-09-05

## 2020-09-05 ENCOUNTER — OUTPATIENT (OUTPATIENT)
Dept: OUTPATIENT SERVICES | Facility: HOSPITAL | Age: 65
LOS: 1 days | End: 2020-09-05
Payer: COMMERCIAL

## 2020-09-05 DIAGNOSIS — M47.14 OTHER SPONDYLOSIS WITH MYELOPATHY, THORACIC REGION: ICD-10-CM

## 2020-09-05 DIAGNOSIS — Z90.89 ACQUIRED ABSENCE OF OTHER ORGANS: Chronic | ICD-10-CM

## 2020-09-05 PROCEDURE — 72146 MRI CHEST SPINE W/O DYE: CPT | Mod: 26

## 2020-09-05 PROCEDURE — 72146 MRI CHEST SPINE W/O DYE: CPT

## 2020-09-11 ENCOUNTER — APPOINTMENT (OUTPATIENT)
Dept: PAIN MANAGEMENT | Facility: CLINIC | Age: 65
End: 2020-09-11
Payer: COMMERCIAL

## 2020-09-11 VITALS
WEIGHT: 158 LBS | BODY MASS INDEX: 22.62 KG/M2 | DIASTOLIC BLOOD PRESSURE: 81 MMHG | HEIGHT: 70 IN | SYSTOLIC BLOOD PRESSURE: 124 MMHG | HEART RATE: 71 BPM

## 2020-09-11 PROCEDURE — 99214 OFFICE O/P EST MOD 30 MIN: CPT

## 2020-09-11 NOTE — PHYSICAL EXAM
[General Appearance - Well Nourished] : well nourished [Person] : oriented to person [Affect] : the affect was normal [Place] : oriented to place [Time] : oriented to time [Sclera] : the sclera and conjunctiva were normal [Neck Appearance] : the appearance of the neck was normal [Outer Ear] : the ears and nose were normal in appearance [Musculoskeletal - Swelling] : no joint swelling seen [] : no rash

## 2020-09-27 NOTE — HISTORY OF PRESENT ILLNESS
[FreeTextEntry1] : Patient reports Emgality works marginally. Notes some constipation requiring Miralax. Patient is going to retire.

## 2020-09-27 NOTE — ASSESSMENT
[FreeTextEntry1] : Chronic migraine\par On trokendi  mgs\par Off BOTOX\par \par Will try Ajovy; dc Emgality

## 2020-09-29 ENCOUNTER — APPOINTMENT (OUTPATIENT)
Dept: ELECTROPHYSIOLOGY | Facility: CLINIC | Age: 65
End: 2020-09-29
Payer: COMMERCIAL

## 2020-09-29 ENCOUNTER — TRANSCRIPTION ENCOUNTER (OUTPATIENT)
Age: 65
End: 2020-09-29

## 2020-09-29 ENCOUNTER — NON-APPOINTMENT (OUTPATIENT)
Age: 65
End: 2020-09-29

## 2020-09-29 VITALS
WEIGHT: 156 LBS | HEART RATE: 72 BPM | SYSTOLIC BLOOD PRESSURE: 120 MMHG | BODY MASS INDEX: 22.38 KG/M2 | DIASTOLIC BLOOD PRESSURE: 81 MMHG | OXYGEN SATURATION: 100 %

## 2020-09-29 PROCEDURE — 99215 OFFICE O/P EST HI 40 MIN: CPT

## 2020-09-29 PROCEDURE — 93291 INTERROG DEV EVAL SCRMS IP: CPT

## 2020-09-29 NOTE — REVIEW OF SYSTEMS
Clinic Consult:  [facility] Consultation Note    Reason for Consult:  The primary encounter diagnosis was Epigastric abdominal pain. Diagnoses of RUQ abdominal pain and Irritable bowel syndrome with both constipation and diarrhea were also pertinent to this visit.    PCP: Zeus Oliva       HPI:  This is a 35 y.o. female here for evaluation of the above  She states that over the last month, she has had intermittent nausea with vomiting and episodes of diarrhea.  She denies any known exacerbating or reliving factors.  She states that she has had some epigastric abdominal pain that radiates to the RUQ.    She reports a history of IBS with constipation and occasional diarrhea, but has never had any upper GI symptoms with it.   She denies any change in diet.  No NSaIDs or EOTH.  No change in medications.   Denies any indigestion or heartburn. Symptoms do seem worse after a fattier meal.   She also reports that she has had some issues with constipation between the episodes of diarrhea.    Review of Systems   Constitutional: Negative for chills, fever, malaise/fatigue and weight loss.   Respiratory: Negative for cough.    Cardiovascular: Negative for chest pain.   Gastrointestinal:        Per HPI   Musculoskeletal: Negative for myalgias.   Skin: Negative for itching and rash.   Neurological: Negative for headaches.   Psychiatric/Behavioral: The patient is not nervous/anxious.        Medical History:   Past Medical History:   Diagnosis Date    B12 deficiency     Chronic low back pain     oberlander    Endometriosis     Fibromyalgia syndrome     Idiopathic hypersomnia     Irritable bowel syndrome     goldman    Migraines     frances       Surgical History:  Past Surgical History:   Procedure Laterality Date     SECTION      OOPHORECTOMY      PELVIC LAPAROSCOPY      TUBAL LIGATION         Family History:   Family History   Problem Relation Age of Onset    Cataracts Maternal Grandmother   "   Diabetes Maternal Grandmother     Glaucoma Maternal Grandmother     Cataracts Maternal Grandfather     Breast cancer Neg Hx     Ovarian cancer Neg Hx     Deep vein thrombosis Neg Hx        Social History:   Social History   Substance Use Topics    Smoking status: Never Smoker    Smokeless tobacco: Never Used    Alcohol use Yes      Comment: rarely       Allergies: Reviewed    Home Medications:   Current Outpatient Prescriptions on File Prior to Visit   Medication Sig Dispense Refill    albuterol (PROAIR HFA) 90 mcg/actuation inhaler Inhale 2 puffs into the lungs every 4 (four) hours as needed for Shortness of Breath. 1 Inhaler 0    cetirizine (ZYRTEC) 10 MG tablet Take 10 mg by mouth once daily.      duloxetine (CYMBALTA) 60 MG capsule TAKE 1 CAPSULE EVERY DAY 90 capsule 2    medroxyPROGESTERone (DEPO-PROVERA) 150 mg/mL Syrg Inject 1 mL (150 mg total) into the muscle every 3 (three) months. 1 mL 3    omeprazole (PRILOSEC) 20 MG capsule TAKE ONE CAPSULE BY MOUTH DAILY 30 capsule 0    topiramate (TOPAMAX) 50 MG tablet Take 1 tablet (50 mg total) by mouth once daily. 120 tablet 6    vitamin D 1000 units Tab Take 1 tablet (1,000 Units total) by mouth once daily. 30 tablet 1     No current facility-administered medications on file prior to visit.        Physical Exam:  Vital Signs:  /68   Pulse 98   Ht 5' 9.6" (1.768 m)   Wt 100.3 kg (221 lb 1.9 oz)   BMI 32.09 kg/m²   Body mass index is 32.09 kg/m².  Physical Exam   Constitutional: She is oriented to person, place, and time. She appears well-developed and well-nourished.   HENT:   Head: Normocephalic.   Eyes: No scleral icterus.   Neck: Normal range of motion.   Cardiovascular: Normal rate and regular rhythm.    Pulmonary/Chest: Effort normal and breath sounds normal.   Abdominal: Soft. Bowel sounds are normal. She exhibits no distension. There is no tenderness.   Musculoskeletal: Normal range of motion.   Neurological: She is alert and " oriented to person, place, and time.   Skin: Skin is warm and dry.   Psychiatric: She has a normal mood and affect.   Vitals reviewed.      Labs: Pertinent labs reviewed.      Assessment:  1. Epigastric abdominal pain    2. RUQ abdominal pain    3. Irritable bowel syndrome with both constipation and diarrhea         Recommendations:  - GBD vs IBS vs other etiologies  - Will get US to R/O GBD  - If unremarkable, will get x-ray  - Given her complaints of constipation, instructed to take a bottle of Magnesium Citrate followed by daily Miralax.    Epigastric abdominal pain  -     US Abdomen Complete; Future; Expected date: 11/15/2017    RUQ abdominal pain  -     US Abdomen Complete; Future; Expected date: 11/15/2017    Irritable bowel syndrome with both constipation and diarrhea        Return in about 4 weeks (around 12/13/2017).        Thank you so much for allowing me to participate in the care of Joy MaierSARAH Stephen   [Eyeglasses] : currently wearing eyeglasses [see HPI] : see HPI [Joint Pain] : joint pain [Numbness] : numbness [Negative] : Heme/Lymph

## 2020-09-30 NOTE — DISCUSSION/SUMMARY
[FreeTextEntry1] : Doing well post ablation.  No symptomatic or asymptomatic arrhythmia (on ILR).  He was advised to use his patient activator for recurrent episode of fast heart rate while cycling. \par \par As the plan has previously been laid out, based on shared decision making he is being followed off of oral anticoagulation using the ILR to assess for asymptomatic recurrence.  I again cautioned him about the limitations, specifically the sensitivity/specificity of the algorithm, ie may miss flutter, as well as any potential delay in identification/notification.  Nonetheless he prefers this plan.  His device is now approaching EOL (3.5 years) and with the implications of COVID he is concerned that there may be some gap in therapy, ie lack of information.  He would like to pursue changing the device ASAP.  All of his questions were answered and the procedure will be arranged. \par

## 2020-09-30 NOTE — HISTORY OF PRESENT ILLNESS
[FreeTextEntry1] : No complaints.  No palpitations.  No chest pain. No shortness of breath. No lightheadedness/dizziness.  He has been riding 100 -110 miles/weekend with no complaints.  He did have a blip on his heart rate monitor, ie up to 180, but he felt fine.

## 2020-10-04 DIAGNOSIS — Z01.818 ENCOUNTER FOR OTHER PREPROCEDURAL EXAMINATION: ICD-10-CM

## 2020-10-05 ENCOUNTER — APPOINTMENT (OUTPATIENT)
Dept: DISASTER EMERGENCY | Facility: CLINIC | Age: 65
End: 2020-10-05

## 2020-10-06 LAB — SARS-COV-2 N GENE NPH QL NAA+PROBE: NOT DETECTED

## 2020-10-08 ENCOUNTER — OUTPATIENT (OUTPATIENT)
Dept: OUTPATIENT SERVICES | Facility: HOSPITAL | Age: 65
LOS: 1 days | End: 2020-10-08
Payer: COMMERCIAL

## 2020-10-08 VITALS
TEMPERATURE: 98 F | WEIGHT: 154.98 LBS | DIASTOLIC BLOOD PRESSURE: 90 MMHG | SYSTOLIC BLOOD PRESSURE: 149 MMHG | HEART RATE: 75 BPM | OXYGEN SATURATION: 100 % | HEIGHT: 70 IN | RESPIRATION RATE: 18 BRPM

## 2020-10-08 VITALS
SYSTOLIC BLOOD PRESSURE: 125 MMHG | OXYGEN SATURATION: 100 % | DIASTOLIC BLOOD PRESSURE: 68 MMHG | RESPIRATION RATE: 16 BRPM | HEART RATE: 69 BPM

## 2020-10-08 DIAGNOSIS — I48.91 UNSPECIFIED ATRIAL FIBRILLATION: ICD-10-CM

## 2020-10-08 DIAGNOSIS — Z90.89 ACQUIRED ABSENCE OF OTHER ORGANS: Chronic | ICD-10-CM

## 2020-10-08 PROCEDURE — 33285 INSJ SUBQ CAR RHYTHM MNTR: CPT

## 2020-10-08 PROCEDURE — 33286 RMVL SUBQ CAR RHYTHM MNTR: CPT | Mod: 59

## 2020-10-08 PROCEDURE — C1764: CPT

## 2020-10-08 RX ORDER — SUMATRIPTAN SUCCINATE 4 MG/.5ML
1 INJECTION, SOLUTION SUBCUTANEOUS
Qty: 0 | Refills: 0 | DISCHARGE

## 2020-10-08 RX ORDER — FREMANEZUMAB-VFRM 225 MG/1.5ML
1.5 INJECTION SUBCUTANEOUS
Qty: 0 | Refills: 0 | DISCHARGE

## 2020-10-08 NOTE — ASU DISCHARGE PLAN (ADULT/PEDIATRIC) - CARE PROVIDER_API CALL
Chapo Godoy)  Cardiac Electrophysiology; Cardiology  [address] [place] [address]  Phone: [phone]  Fax: [phone]  Established Patient  Scheduled Appointment: 10/15/2020 02:20 PM

## 2020-10-08 NOTE — ASU DISCHARGE PLAN (ADULT/PEDIATRIC) - CALL YOUR DOCTOR IF YOU HAVE ANY OF THE FOLLOWING:
Pain not relieved by Medications/Swelling that gets worse/Wound/Surgical Site with redness, or foul smelling discharge or pus/Bleeding that does not stop

## 2020-10-08 NOTE — ASU DISCHARGE PLAN (ADULT/PEDIATRIC) - PROVIDER TOKENS
PROVIDER:[TOKEN:[[id number]],SCHEDULEDAPPT:[10/15/2020],SCHEDULEDAPPTTIME:[02:20 PM],ESTABLISHEDPATIENT:[T]]

## 2020-10-15 ENCOUNTER — APPOINTMENT (OUTPATIENT)
Dept: ELECTROPHYSIOLOGY | Facility: CLINIC | Age: 65
End: 2020-10-15
Payer: COMMERCIAL

## 2020-10-15 VITALS
DIASTOLIC BLOOD PRESSURE: 93 MMHG | OXYGEN SATURATION: 100 % | BODY MASS INDEX: 22.33 KG/M2 | HEART RATE: 78 BPM | HEIGHT: 70 IN | WEIGHT: 156 LBS | SYSTOLIC BLOOD PRESSURE: 136 MMHG

## 2020-10-15 PROCEDURE — 93000 ELECTROCARDIOGRAM COMPLETE: CPT

## 2020-10-20 ENCOUNTER — TRANSCRIPTION ENCOUNTER (OUTPATIENT)
Age: 65
End: 2020-10-20

## 2020-10-22 ENCOUNTER — TRANSCRIPTION ENCOUNTER (OUTPATIENT)
Age: 65
End: 2020-10-22

## 2020-10-29 ENCOUNTER — NON-APPOINTMENT (OUTPATIENT)
Age: 65
End: 2020-10-29

## 2020-10-29 ENCOUNTER — APPOINTMENT (OUTPATIENT)
Dept: ELECTROPHYSIOLOGY | Facility: CLINIC | Age: 65
End: 2020-10-29
Payer: COMMERCIAL

## 2020-10-29 PROCEDURE — G2066: CPT

## 2020-10-29 PROCEDURE — 93298 REM INTERROG DEV EVAL SCRMS: CPT

## 2020-11-02 ENCOUNTER — RX RENEWAL (OUTPATIENT)
Age: 65
End: 2020-11-02

## 2020-11-24 ENCOUNTER — RX RENEWAL (OUTPATIENT)
Age: 65
End: 2020-11-24

## 2021-01-06 ENCOUNTER — APPOINTMENT (OUTPATIENT)
Dept: ELECTROPHYSIOLOGY | Facility: CLINIC | Age: 66
End: 2021-01-06
Payer: MEDICARE

## 2021-01-06 PROCEDURE — G2066: CPT

## 2021-01-06 PROCEDURE — 93298 REM INTERROG DEV EVAL SCRMS: CPT

## 2021-01-19 ENCOUNTER — TRANSCRIPTION ENCOUNTER (OUTPATIENT)
Age: 66
End: 2021-01-19

## 2021-02-09 ENCOUNTER — NON-APPOINTMENT (OUTPATIENT)
Age: 66
End: 2021-02-09

## 2021-02-09 ENCOUNTER — APPOINTMENT (OUTPATIENT)
Dept: ELECTROPHYSIOLOGY | Facility: CLINIC | Age: 66
End: 2021-02-09
Payer: MEDICARE

## 2021-02-09 PROCEDURE — 93298 REM INTERROG DEV EVAL SCRMS: CPT

## 2021-02-09 PROCEDURE — G2066: CPT

## 2021-02-11 ENCOUNTER — TRANSCRIPTION ENCOUNTER (OUTPATIENT)
Age: 66
End: 2021-02-11

## 2021-03-04 ENCOUNTER — NON-APPOINTMENT (OUTPATIENT)
Age: 66
End: 2021-03-04

## 2021-03-08 ENCOUNTER — NON-APPOINTMENT (OUTPATIENT)
Age: 66
End: 2021-03-08

## 2021-03-08 ENCOUNTER — APPOINTMENT (OUTPATIENT)
Dept: ELECTROPHYSIOLOGY | Facility: CLINIC | Age: 66
End: 2021-03-08
Payer: MEDICARE

## 2021-03-08 PROCEDURE — G2066: CPT

## 2021-03-08 PROCEDURE — 93298 REM INTERROG DEV EVAL SCRMS: CPT

## 2021-03-23 ENCOUNTER — TRANSCRIPTION ENCOUNTER (OUTPATIENT)
Age: 66
End: 2021-03-23

## 2021-03-27 NOTE — REVIEW OF SYSTEMS
[Eyeglasses] : currently wearing eyeglasses [see HPI] : see HPI [Joint Pain] : joint pain [Numbness] : numbness [Negative] : Heme/Lymph

## 2021-03-30 ENCOUNTER — APPOINTMENT (OUTPATIENT)
Dept: ELECTROPHYSIOLOGY | Facility: CLINIC | Age: 66
End: 2021-03-30
Payer: MEDICARE

## 2021-03-30 ENCOUNTER — NON-APPOINTMENT (OUTPATIENT)
Age: 66
End: 2021-03-30

## 2021-03-30 VITALS — OXYGEN SATURATION: 100 % | WEIGHT: 157 LBS | HEIGHT: 70 IN | BODY MASS INDEX: 22.48 KG/M2 | HEART RATE: 69 BPM

## 2021-03-30 VITALS — SYSTOLIC BLOOD PRESSURE: 118 MMHG | DIASTOLIC BLOOD PRESSURE: 81 MMHG

## 2021-03-30 PROCEDURE — 99213 OFFICE O/P EST LOW 20 MIN: CPT

## 2021-03-30 PROCEDURE — 93000 ELECTROCARDIOGRAM COMPLETE: CPT | Mod: 59

## 2021-03-30 PROCEDURE — 93291 INTERROG DEV EVAL SCRMS IP: CPT

## 2021-03-30 NOTE — HISTORY OF PRESENT ILLNESS
[FreeTextEntry1] : At last visit found to have NSVT on ILR.  Subsequent evaluation included a stress test that was reportedly normal.  In [facility] his cycling has increased.  He is doing better with increasing dooley.  No chest pain. No shortness of breath.  No palpitations.

## 2021-03-30 NOTE — DISCUSSION/SUMMARY
[FreeTextEntry1] : Doing well post ablation.  No symptomatic or asymptomatic arrhythmia (on ILR). \par \par As the plan has previously been laid out, based on shared decision making he is being followed off of oral anticoagulation using the ILR to assess for asymptomatic recurrence.  I again cautioned him about the limitations, specifically the sensitivity/specificity of the algorithm, ie may miss flutter, as well as any potential delay in identification/notification.  Nonetheless he prefers this plan.  \par \par No further NSVT. \par

## 2021-04-20 ENCOUNTER — APPOINTMENT (OUTPATIENT)
Dept: ELECTROPHYSIOLOGY | Facility: CLINIC | Age: 66
End: 2021-04-20
Payer: MEDICARE

## 2021-04-20 ENCOUNTER — NON-APPOINTMENT (OUTPATIENT)
Age: 66
End: 2021-04-20

## 2021-04-20 PROCEDURE — 93298 REM INTERROG DEV EVAL SCRMS: CPT

## 2021-04-20 PROCEDURE — G2066: CPT

## 2021-05-25 ENCOUNTER — APPOINTMENT (OUTPATIENT)
Dept: ELECTROPHYSIOLOGY | Facility: CLINIC | Age: 66
End: 2021-05-25
Payer: MEDICARE

## 2021-05-25 ENCOUNTER — NON-APPOINTMENT (OUTPATIENT)
Age: 66
End: 2021-05-25

## 2021-05-25 PROCEDURE — 93298 REM INTERROG DEV EVAL SCRMS: CPT

## 2021-05-25 PROCEDURE — G2066: CPT

## 2021-06-29 ENCOUNTER — NON-APPOINTMENT (OUTPATIENT)
Age: 66
End: 2021-06-29

## 2021-06-29 ENCOUNTER — APPOINTMENT (OUTPATIENT)
Dept: ELECTROPHYSIOLOGY | Facility: CLINIC | Age: 66
End: 2021-06-29
Payer: MEDICARE

## 2021-06-29 PROCEDURE — 93298 REM INTERROG DEV EVAL SCRMS: CPT

## 2021-06-29 PROCEDURE — G2066: CPT

## 2021-08-03 ENCOUNTER — APPOINTMENT (OUTPATIENT)
Dept: ELECTROPHYSIOLOGY | Facility: CLINIC | Age: 66
End: 2021-08-03
Payer: MEDICARE

## 2021-08-03 ENCOUNTER — NON-APPOINTMENT (OUTPATIENT)
Age: 66
End: 2021-08-03

## 2021-08-03 PROCEDURE — G2066: CPT

## 2021-08-03 PROCEDURE — 93298 REM INTERROG DEV EVAL SCRMS: CPT

## 2021-09-14 ENCOUNTER — APPOINTMENT (OUTPATIENT)
Dept: ELECTROPHYSIOLOGY | Facility: CLINIC | Age: 66
End: 2021-09-14
Payer: MEDICARE

## 2021-09-14 ENCOUNTER — NON-APPOINTMENT (OUTPATIENT)
Age: 66
End: 2021-09-14

## 2021-09-14 VITALS
WEIGHT: 152 LBS | BODY MASS INDEX: 21.76 KG/M2 | DIASTOLIC BLOOD PRESSURE: 80 MMHG | HEART RATE: 71 BPM | SYSTOLIC BLOOD PRESSURE: 123 MMHG | HEIGHT: 70 IN | OXYGEN SATURATION: 100 %

## 2021-09-14 PROCEDURE — 99213 OFFICE O/P EST LOW 20 MIN: CPT

## 2021-09-14 PROCEDURE — 93000 ELECTROCARDIOGRAM COMPLETE: CPT | Mod: 59

## 2021-09-14 PROCEDURE — 93285 PRGRMG DEV EVAL SCRMS IP: CPT

## 2021-09-14 RX ORDER — GALCANEZUMAB 120 MG/ML
120 INJECTION, SOLUTION SUBCUTANEOUS
Qty: 3 | Refills: 5 | Status: DISCONTINUED | COMMUNITY
Start: 2020-05-04 | End: 2021-09-14

## 2021-09-14 NOTE — HISTORY OF PRESENT ILLNESS
[FreeTextEntry1] : Over 6000 miles biking so far this year.  He lives his life without limitations. NO palpitations. NO chest pain. NO shortness of breath.

## 2021-09-14 NOTE — DISCUSSION/SUMMARY
[FreeTextEntry1] : Doing well post ablation.  No symptomatic or asymptomatic arrhythmia (on ILR). \par \par As the plan has previously been laid out, based on shared decision making he is being followed off of oral anticoagulation using the ILR to assess for asymptomatic recurrence.  I again cautioned him about the limitations, specifically the sensitivity/specificity of the algorithm, ie may miss flutter, as well as any potential delay in identification/notification.  Nonetheless he prefers this plan.  \par \par No further NSVT. \par \par Continue current therapy. \par

## 2021-09-14 NOTE — REASON FOR VISIT
[Follow-Up - Clinic] : a clinic follow-up of [Atrial Fibrillation] : atrial fibrillation [Spouse] : spouse [Arrhythmia/ECG Abnorrmalities] : arrhythmia/ECG abnormalities

## 2021-09-15 ENCOUNTER — APPOINTMENT (OUTPATIENT)
Dept: OPHTHALMOLOGY | Facility: CLINIC | Age: 66
End: 2021-09-15
Payer: MEDICARE

## 2021-09-15 ENCOUNTER — NON-APPOINTMENT (OUTPATIENT)
Age: 66
End: 2021-09-15

## 2021-09-15 PROCEDURE — 92014 COMPRE OPH EXAM EST PT 1/>: CPT

## 2021-10-06 PROBLEM — I10 ESSENTIAL HYPERTENSION: Status: ACTIVE | Noted: 2017-04-14

## 2021-10-11 ENCOUNTER — APPOINTMENT (OUTPATIENT)
Dept: ELECTROPHYSIOLOGY | Facility: CLINIC | Age: 66
End: 2021-10-11
Payer: MEDICARE

## 2021-10-11 ENCOUNTER — NON-APPOINTMENT (OUTPATIENT)
Age: 66
End: 2021-10-11

## 2021-10-11 PROCEDURE — 93298 REM INTERROG DEV EVAL SCRMS: CPT

## 2021-10-11 PROCEDURE — G2066: CPT

## 2021-10-18 ENCOUNTER — APPOINTMENT (OUTPATIENT)
Dept: PAIN MANAGEMENT | Facility: CLINIC | Age: 66
End: 2021-10-18
Payer: MEDICARE

## 2021-10-18 PROCEDURE — 99213 OFFICE O/P EST LOW 20 MIN: CPT

## 2021-10-18 NOTE — ASSESSMENT
[FreeTextEntry1] : Chronic migraines\par Stable on Trokendi.\par No AEs. \par \par Continue current plan

## 2021-10-18 NOTE — HISTORY OF PRESENT ILLNESS
[FreeTextEntry1] : Since last visit, patient reports head and neck pain is controlled. If he does neck exercises and massage the headache is lessened. Trials of the three Mabs were ineffective. Does not want to try other options at this point. He notes Trokendi  mgs is helping the headache frequency

## 2021-10-19 ENCOUNTER — RX RENEWAL (OUTPATIENT)
Age: 66
End: 2021-10-19

## 2021-10-21 ENCOUNTER — TRANSCRIPTION ENCOUNTER (OUTPATIENT)
Age: 66
End: 2021-10-21

## 2021-11-15 ENCOUNTER — NON-APPOINTMENT (OUTPATIENT)
Age: 66
End: 2021-11-15

## 2021-11-15 ENCOUNTER — APPOINTMENT (OUTPATIENT)
Dept: ELECTROPHYSIOLOGY | Facility: CLINIC | Age: 66
End: 2021-11-15
Payer: MEDICARE

## 2021-11-15 PROCEDURE — 93298 REM INTERROG DEV EVAL SCRMS: CPT

## 2021-11-15 PROCEDURE — G2066: CPT | Mod: NC

## 2021-12-20 ENCOUNTER — NON-APPOINTMENT (OUTPATIENT)
Age: 66
End: 2021-12-20

## 2021-12-20 ENCOUNTER — APPOINTMENT (OUTPATIENT)
Dept: ELECTROPHYSIOLOGY | Facility: CLINIC | Age: 66
End: 2021-12-20
Payer: MEDICARE

## 2021-12-20 PROCEDURE — 93298 REM INTERROG DEV EVAL SCRMS: CPT

## 2021-12-20 PROCEDURE — G2066: CPT

## 2022-01-24 ENCOUNTER — APPOINTMENT (OUTPATIENT)
Dept: ELECTROPHYSIOLOGY | Facility: CLINIC | Age: 67
End: 2022-01-24
Payer: MEDICARE

## 2022-01-24 ENCOUNTER — NON-APPOINTMENT (OUTPATIENT)
Age: 67
End: 2022-01-24

## 2022-01-24 PROCEDURE — 93298 REM INTERROG DEV EVAL SCRMS: CPT

## 2022-01-24 PROCEDURE — G2066: CPT

## 2022-02-28 NOTE — REASON FOR VISIT
[Arrhythmia/ECG Abnorrmalities] : arrhythmia/ECG abnormalities [Follow-Up - Clinic] : a clinic follow-up of [Atrial Fibrillation] : atrial fibrillation [Spouse] : spouse

## 2022-03-01 ENCOUNTER — APPOINTMENT (OUTPATIENT)
Dept: ELECTROPHYSIOLOGY | Facility: CLINIC | Age: 67
End: 2022-03-01
Payer: MEDICARE

## 2022-03-01 ENCOUNTER — NON-APPOINTMENT (OUTPATIENT)
Age: 67
End: 2022-03-01

## 2022-03-01 VITALS
WEIGHT: 155 LBS | HEART RATE: 66 BPM | HEIGHT: 70 IN | SYSTOLIC BLOOD PRESSURE: 136 MMHG | BODY MASS INDEX: 22.19 KG/M2 | DIASTOLIC BLOOD PRESSURE: 77 MMHG | OXYGEN SATURATION: 99 %

## 2022-03-01 PROCEDURE — 93291 INTERROG DEV EVAL SCRMS IP: CPT

## 2022-03-01 PROCEDURE — 93000 ELECTROCARDIOGRAM COMPLETE: CPT | Mod: 59

## 2022-03-01 PROCEDURE — 99213 OFFICE O/P EST LOW 20 MIN: CPT

## 2022-03-01 NOTE — HISTORY OF PRESENT ILLNESS
[FreeTextEntry1] : Over 1000 miles so far this year (2 months).  He is doing [employer] group rides with his friends.  Overall he feels well. No chest pain. No shortness of breath.

## 2022-04-07 ENCOUNTER — APPOINTMENT (OUTPATIENT)
Dept: ELECTROPHYSIOLOGY | Facility: CLINIC | Age: 67
End: 2022-04-07
Payer: MEDICARE

## 2022-04-07 ENCOUNTER — NON-APPOINTMENT (OUTPATIENT)
Age: 67
End: 2022-04-07

## 2022-04-07 PROCEDURE — 93298 REM INTERROG DEV EVAL SCRMS: CPT

## 2022-04-07 PROCEDURE — G2066: CPT

## 2022-04-15 ENCOUNTER — NON-APPOINTMENT (OUTPATIENT)
Age: 67
End: 2022-04-15

## 2022-05-11 ENCOUNTER — APPOINTMENT (OUTPATIENT)
Dept: ELECTROPHYSIOLOGY | Facility: CLINIC | Age: 67
End: 2022-05-11
Payer: MEDICARE

## 2022-05-11 ENCOUNTER — NON-APPOINTMENT (OUTPATIENT)
Age: 67
End: 2022-05-11

## 2022-05-11 PROCEDURE — G2066: CPT

## 2022-05-11 PROCEDURE — 93298 REM INTERROG DEV EVAL SCRMS: CPT

## 2022-05-23 ENCOUNTER — NON-APPOINTMENT (OUTPATIENT)
Age: 67
End: 2022-05-23

## 2022-05-23 ENCOUNTER — APPOINTMENT (OUTPATIENT)
Dept: OPHTHALMOLOGY | Facility: CLINIC | Age: 67
End: 2022-05-23
Payer: SELF-PAY

## 2022-05-23 PROCEDURE — 92015 DETERMINE REFRACTIVE STATE: CPT

## 2022-06-14 ENCOUNTER — NON-APPOINTMENT (OUTPATIENT)
Age: 67
End: 2022-06-14

## 2022-06-14 ENCOUNTER — APPOINTMENT (OUTPATIENT)
Dept: ELECTROPHYSIOLOGY | Facility: CLINIC | Age: 67
End: 2022-06-14
Payer: MEDICARE

## 2022-06-14 PROCEDURE — G2066: CPT

## 2022-06-14 PROCEDURE — 93298 REM INTERROG DEV EVAL SCRMS: CPT

## 2022-07-17 ENCOUNTER — NON-APPOINTMENT (OUTPATIENT)
Age: 67
End: 2022-07-17

## 2022-07-18 ENCOUNTER — APPOINTMENT (OUTPATIENT)
Dept: ELECTROPHYSIOLOGY | Facility: CLINIC | Age: 67
End: 2022-07-18

## 2022-07-18 PROCEDURE — 93298 REM INTERROG DEV EVAL SCRMS: CPT

## 2022-07-18 PROCEDURE — G2066: CPT

## 2022-08-10 ENCOUNTER — RX RENEWAL (OUTPATIENT)
Age: 67
End: 2022-08-10

## 2022-08-11 ENCOUNTER — TRANSCRIPTION ENCOUNTER (OUTPATIENT)
Age: 67
End: 2022-08-11

## 2022-08-13 ENCOUNTER — TRANSCRIPTION ENCOUNTER (OUTPATIENT)
Age: 67
End: 2022-08-13

## 2022-08-15 ENCOUNTER — TRANSCRIPTION ENCOUNTER (OUTPATIENT)
Age: 67
End: 2022-08-15

## 2022-08-17 NOTE — H&P CARDIOLOGY - BMI (KG/M2)
Outreach attempt was made to schedule a Medicare Wellness Visit. This was the first attempt. Contact was made, [address] appointment refused. Patient not called. Patient only has Medicaid. 23.66

## 2022-08-22 ENCOUNTER — APPOINTMENT (OUTPATIENT)
Dept: ELECTROPHYSIOLOGY | Facility: CLINIC | Age: 67
End: 2022-08-22

## 2022-08-22 ENCOUNTER — NON-APPOINTMENT (OUTPATIENT)
Age: 67
End: 2022-08-22

## 2022-08-22 PROCEDURE — 93298 REM INTERROG DEV EVAL SCRMS: CPT

## 2022-08-22 PROCEDURE — G2066: CPT

## 2022-09-05 NOTE — REASON FOR VISIT
[Arrhythmia/ECG Abnorrmalities] : arrhythmia/ECG abnormalities [Follow-Up - Clinic] : a clinic follow-up of [Atrial Fibrillation] : atrial fibrillation

## 2022-09-06 ENCOUNTER — NON-APPOINTMENT (OUTPATIENT)
Age: 67
End: 2022-09-06

## 2022-09-06 ENCOUNTER — APPOINTMENT (OUTPATIENT)
Dept: ELECTROPHYSIOLOGY | Facility: CLINIC | Age: 67
End: 2022-09-06

## 2022-09-06 VITALS
OXYGEN SATURATION: 100 % | BODY MASS INDEX: 21.76 KG/M2 | WEIGHT: 152 LBS | HEART RATE: 60 BPM | SYSTOLIC BLOOD PRESSURE: 116 MMHG | RESPIRATION RATE: 14 BRPM | DIASTOLIC BLOOD PRESSURE: 68 MMHG | HEIGHT: 70 IN

## 2022-09-06 PROCEDURE — 99214 OFFICE O/P EST MOD 30 MIN: CPT

## 2022-09-06 PROCEDURE — 93285 PRGRMG DEV EVAL SCRMS IP: CPT

## 2022-09-06 RX ORDER — FREMANEZUMAB-VFRM 225 MG/1.5ML
225 INJECTION SUBCUTANEOUS
Qty: 1.5 | Refills: 5 | Status: DISCONTINUED | COMMUNITY
Start: 2020-09-11 | End: 2022-09-06

## 2022-09-06 NOTE — HISTORY OF PRESENT ILLNESS
[FreeTextEntry1] : Over 5500 miles so far this year.  He is doing [employer] group rides with his friends; living his life without limitations.  Overall he feels well. No chest pain. No shortness of breath. No lightheadedness/dizziness.

## 2022-09-21 ENCOUNTER — NON-APPOINTMENT (OUTPATIENT)
Age: 67
End: 2022-09-21

## 2022-09-21 ENCOUNTER — APPOINTMENT (OUTPATIENT)
Dept: OPHTHALMOLOGY | Facility: CLINIC | Age: 67
End: 2022-09-21

## 2022-09-21 PROCEDURE — 92014 COMPRE OPH EXAM EST PT 1/>: CPT

## 2022-09-26 ENCOUNTER — NON-APPOINTMENT (OUTPATIENT)
Age: 67
End: 2022-09-26

## 2022-09-26 ENCOUNTER — APPOINTMENT (OUTPATIENT)
Dept: ELECTROPHYSIOLOGY | Facility: CLINIC | Age: 67
End: 2022-09-26

## 2022-09-26 PROCEDURE — 93298 REM INTERROG DEV EVAL SCRMS: CPT

## 2022-09-26 PROCEDURE — G2066: CPT

## 2022-10-31 ENCOUNTER — APPOINTMENT (OUTPATIENT)
Dept: ELECTROPHYSIOLOGY | Facility: CLINIC | Age: 67
End: 2022-10-31

## 2022-10-31 ENCOUNTER — NON-APPOINTMENT (OUTPATIENT)
Age: 67
End: 2022-10-31

## 2022-10-31 PROCEDURE — G2066: CPT

## 2022-10-31 PROCEDURE — 93298 REM INTERROG DEV EVAL SCRMS: CPT

## 2022-12-05 ENCOUNTER — APPOINTMENT (OUTPATIENT)
Dept: ELECTROPHYSIOLOGY | Facility: CLINIC | Age: 67
End: 2022-12-05

## 2022-12-05 ENCOUNTER — NON-APPOINTMENT (OUTPATIENT)
Age: 67
End: 2022-12-05

## 2022-12-05 PROCEDURE — G2066: CPT

## 2022-12-05 PROCEDURE — 93298 REM INTERROG DEV EVAL SCRMS: CPT

## 2023-01-09 ENCOUNTER — NON-APPOINTMENT (OUTPATIENT)
Age: 68
End: 2023-01-09

## 2023-01-09 ENCOUNTER — APPOINTMENT (OUTPATIENT)
Dept: ELECTROPHYSIOLOGY | Facility: CLINIC | Age: 68
End: 2023-01-09
Payer: MEDICARE

## 2023-01-09 PROCEDURE — G2066: CPT

## 2023-01-09 PROCEDURE — 93298 REM INTERROG DEV EVAL SCRMS: CPT

## 2023-02-13 ENCOUNTER — NON-APPOINTMENT (OUTPATIENT)
Age: 68
End: 2023-02-13

## 2023-02-13 ENCOUNTER — APPOINTMENT (OUTPATIENT)
Dept: ELECTROPHYSIOLOGY | Facility: CLINIC | Age: 68
End: 2023-02-13
Payer: MEDICARE

## 2023-02-13 PROCEDURE — G2066: CPT

## 2023-02-13 PROCEDURE — 93298 REM INTERROG DEV EVAL SCRMS: CPT

## 2023-03-07 ENCOUNTER — NON-APPOINTMENT (OUTPATIENT)
Age: 68
End: 2023-03-07

## 2023-03-07 ENCOUNTER — APPOINTMENT (OUTPATIENT)
Dept: ELECTROPHYSIOLOGY | Facility: CLINIC | Age: 68
End: 2023-03-07
Payer: MEDICARE

## 2023-03-07 VITALS
HEART RATE: 75 BPM | HEIGHT: 70 IN | SYSTOLIC BLOOD PRESSURE: 116 MMHG | BODY MASS INDEX: 21.9 KG/M2 | OXYGEN SATURATION: 100 % | WEIGHT: 153 LBS | DIASTOLIC BLOOD PRESSURE: 76 MMHG

## 2023-03-07 PROCEDURE — 93285 PRGRMG DEV EVAL SCRMS IP: CPT

## 2023-03-07 PROCEDURE — 99213 OFFICE O/P EST LOW 20 MIN: CPT

## 2023-03-07 RX ORDER — SUMATRIPTAN SUCCINATE 100 MG/1
100 TABLET, FILM COATED ORAL
Refills: 0 | Status: ACTIVE | COMMUNITY

## 2023-03-07 RX ORDER — MONTELUKAST SODIUM 10 MG/1
10 TABLET, FILM COATED ORAL
Qty: 90 | Refills: 0 | Status: ACTIVE | COMMUNITY

## 2023-03-07 RX ORDER — CHOLECALCIFEROL (VITAMIN D3) 50 MCG
50 MCG TABLET ORAL DAILY
Refills: 0 | Status: ACTIVE | COMMUNITY

## 2023-03-07 RX ORDER — FINASTERIDE 5 MG/1
5 TABLET, FILM COATED ORAL DAILY
Refills: 0 | Status: ACTIVE | COMMUNITY

## 2023-03-08 NOTE — DISCUSSION/SUMMARY
[FreeTextEntry1] : Doing well post ablation.  No symptomatic or asymptomatic arrhythmia (on ILR). \par \par As the plan has previously been laid out, based on shared decision making he is being followed off of oral anticoagulation using the ILR to assess for asymptomatic recurrence.  I again cautioned him about the limitations, specifically the sensitivity/specificity of the algorithm, ie may miss flutter, as well as any potential delay in identification/notification.  Nonetheless he prefers this plan.  \par \par No further NSVT, but he does have PVCs on today's ECG, 1% burden on ILR.  I suggested that he transmit a "symptom" episode when he is exercising so that we can see if the ectopy decreases with exercise.  \par \par Continue current therapy. \par

## 2023-03-08 NOTE — HISTORY OF PRESENT ILLNESS
[FreeTextEntry1] : He had COVID earlier this year but is still riding.  He is doing [employer] group rides with his friends; living his life without limitations.  Overall he feels well. No chest pain. No shortness of breath. No lightheadedness/dizziness.

## 2023-04-11 ENCOUNTER — NON-APPOINTMENT (OUTPATIENT)
Age: 68
End: 2023-04-11

## 2023-04-11 ENCOUNTER — APPOINTMENT (OUTPATIENT)
Dept: ELECTROPHYSIOLOGY | Facility: CLINIC | Age: 68
End: 2023-04-11
Payer: MEDICARE

## 2023-04-11 PROCEDURE — 93298 REM INTERROG DEV EVAL SCRMS: CPT

## 2023-04-11 PROCEDURE — G2066: CPT

## 2023-05-16 ENCOUNTER — APPOINTMENT (OUTPATIENT)
Dept: ELECTROPHYSIOLOGY | Facility: CLINIC | Age: 68
End: 2023-05-16
Payer: MEDICARE

## 2023-05-16 ENCOUNTER — NON-APPOINTMENT (OUTPATIENT)
Age: 68
End: 2023-05-16

## 2023-05-16 PROCEDURE — G2066: CPT

## 2023-05-16 PROCEDURE — 93298 REM INTERROG DEV EVAL SCRMS: CPT

## 2023-06-20 ENCOUNTER — APPOINTMENT (OUTPATIENT)
Dept: ELECTROPHYSIOLOGY | Facility: CLINIC | Age: 68
End: 2023-06-20
Payer: MEDICARE

## 2023-06-20 ENCOUNTER — NON-APPOINTMENT (OUTPATIENT)
Age: 68
End: 2023-06-20

## 2023-06-20 PROCEDURE — 93298 REM INTERROG DEV EVAL SCRMS: CPT

## 2023-06-20 PROCEDURE — G2066: CPT

## 2023-07-25 ENCOUNTER — APPOINTMENT (OUTPATIENT)
Dept: ELECTROPHYSIOLOGY | Facility: CLINIC | Age: 68
End: 2023-07-25
Payer: MEDICARE

## 2023-07-25 ENCOUNTER — NON-APPOINTMENT (OUTPATIENT)
Age: 68
End: 2023-07-25

## 2023-07-25 PROCEDURE — 93298 REM INTERROG DEV EVAL SCRMS: CPT

## 2023-07-25 PROCEDURE — G2066: CPT

## 2023-08-04 ENCOUNTER — RX RENEWAL (OUTPATIENT)
Age: 68
End: 2023-08-04

## 2023-08-04 DIAGNOSIS — G43.709 CHRONIC MIGRAINE W/OUT AURA, NOT INTRACTABLE, W/OUT STATUS MIGRAINOSUS: ICD-10-CM

## 2023-08-04 RX ORDER — TOPIRAMATE 100 MG/1
100 CAPSULE, EXTENDED RELEASE ORAL
Qty: 90 | Refills: 3 | Status: ACTIVE | COMMUNITY
Start: 2023-08-04 | End: 1900-01-01

## 2023-08-14 ENCOUNTER — NON-APPOINTMENT (OUTPATIENT)
Age: 68
End: 2023-08-14

## 2023-08-15 ENCOUNTER — NON-APPOINTMENT (OUTPATIENT)
Age: 68
End: 2023-08-15

## 2023-08-15 ENCOUNTER — APPOINTMENT (OUTPATIENT)
Dept: PHYSICAL MEDICINE AND REHAB | Facility: CLINIC | Age: 68
End: 2023-08-15
Payer: MEDICARE

## 2023-08-15 PROCEDURE — 99203 OFFICE O/P NEW LOW 30 MIN: CPT

## 2023-08-15 NOTE — ASSESSMENT
[FreeTextEntry1] : - Refer to PT for balance/proprioceptive activities, strengthen postural muscles - Offered gabapentin for neuropathic pain but patient prefers not to manage with medications.  - Would like further w/u of peripheral neuropathy- referred to Dr. Bran. - Also requested referral for vascular sx - f/u prn

## 2023-08-15 NOTE — PHYSICAL EXAM
[Normal] : Oriented to person, place, and time, insight and judgement were intact and the affect was normal [de-identified] : no distress [de-identified] : well perfused [de-identified] : Pain w end range rotation bl, nml rotation bl.  [de-identified] : nml gait; significant difficulty and poor balance w tandem walking- almost falls.  [de-identified] : diminished proprioception bl- worse on the left, decreased pinprick distally.

## 2023-08-15 NOTE — HISTORY OF PRESENT ILLNESS
Pt noted to be satting 84% on 4 L, pt changed to venturi mask per verbal order Dr Leyla Nieves, O2 sats did not improve, Dr Leyla Nieves at bedside Pt slowly increased to 50 % venti mask .  Pt currently satting 88%     Elpidio Koyanagi, RN  10/28/20 1271
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  Always "feels like there is fluid" in the feet.  Has difficulty walking, particularly when wearing socks and shoes- support hose help - gets uncomfortable when walking for a period of time.  has not had any falls. Has not had any therapies.  When he walks gets uncomfortable but can cycle up to 60 miles without complaints.  Described as uncomfortable. Can be painful and tingling.  No issues with his hands.  Gets mid-thoracic pain with walking. Gets tightness of the upper trap and neck.

## 2023-08-16 ENCOUNTER — NON-APPOINTMENT (OUTPATIENT)
Age: 68
End: 2023-08-16

## 2023-08-28 ENCOUNTER — NON-APPOINTMENT (OUTPATIENT)
Age: 68
End: 2023-08-28

## 2023-08-28 ENCOUNTER — APPOINTMENT (OUTPATIENT)
Dept: ELECTROPHYSIOLOGY | Facility: CLINIC | Age: 68
End: 2023-08-28
Payer: MEDICARE

## 2023-08-28 PROCEDURE — G2066: CPT

## 2023-08-28 PROCEDURE — 93298 REM INTERROG DEV EVAL SCRMS: CPT

## 2023-09-10 NOTE — PHYSICAL EXAM
[Well Developed] : well developed [Well Nourished] : well nourished [No Acute Distress] : no acute distress [Normal Conjunctiva] : normal conjunctiva [Normal Venous Pressure] : normal venous pressure [No Carotid Bruit] : no carotid bruit [Normal S1, S2] : normal S1, S2 [No Rub] : no rub [No Murmur] : no murmur [No Gallop] : no gallop [Clear Lung Fields] : clear lung fields [Good Air Entry] : good air entry [No Respiratory Distress] : no respiratory distress  [Soft] : abdomen soft [No Masses/organomegaly] : no masses/organomegaly [Non Tender] : non-tender [Normal Bowel Sounds] : normal bowel sounds [Normal Gait] : normal gait [No Edema] : no edema [No Cyanosis] : no cyanosis [No Clubbing] : no clubbing [No Varicosities] : no varicosities [No Rash] : no rash [No Skin Lesions] : no skin lesions [Moves all extremities] : moves all extremities [No Focal Deficits] : no focal deficits [Normal Speech] : normal speech [Alert and Oriented] : alert and oriented [Normal memory] : normal memory

## 2023-09-10 NOTE — HISTORY OF PRESENT ILLNESS
[FreeTextEntry1] : He had COVID earlier this year but is still riding.  He is doing [employer] group rides with his friends; living his life without limitations.  Overall he feels well. No chest pain. No shortness of breath. No lightheadedness/dizziness.

## 2023-09-12 ENCOUNTER — NON-APPOINTMENT (OUTPATIENT)
Age: 68
End: 2023-09-12

## 2023-09-12 ENCOUNTER — APPOINTMENT (OUTPATIENT)
Dept: ELECTROPHYSIOLOGY | Facility: CLINIC | Age: 68
End: 2023-09-12
Payer: MEDICARE

## 2023-09-12 VITALS
HEART RATE: 77 BPM | DIASTOLIC BLOOD PRESSURE: 80 MMHG | HEIGHT: 70 IN | OXYGEN SATURATION: 99 % | SYSTOLIC BLOOD PRESSURE: 118 MMHG

## 2023-09-12 DIAGNOSIS — I49.3 VENTRICULAR PREMATURE DEPOLARIZATION: ICD-10-CM

## 2023-09-12 PROCEDURE — 93285 PRGRMG DEV EVAL SCRMS IP: CPT

## 2023-09-12 PROCEDURE — 99213 OFFICE O/P EST LOW 20 MIN: CPT

## 2023-09-13 ENCOUNTER — NON-APPOINTMENT (OUTPATIENT)
Age: 68
End: 2023-09-13

## 2023-09-13 ENCOUNTER — APPOINTMENT (OUTPATIENT)
Dept: OPHTHALMOLOGY | Facility: CLINIC | Age: 68
End: 2023-09-13
Payer: MEDICARE

## 2023-09-13 PROBLEM — I49.3 PVC'S (PREMATURE VENTRICULAR CONTRACTIONS): Status: ACTIVE | Noted: 2023-09-13

## 2023-09-13 PROCEDURE — 92014 COMPRE OPH EXAM EST PT 1/>: CPT

## 2023-09-18 ENCOUNTER — APPOINTMENT (OUTPATIENT)
Dept: VASCULAR SURGERY | Facility: CLINIC | Age: 68
End: 2023-09-18
Payer: MEDICARE

## 2023-09-18 ENCOUNTER — NON-APPOINTMENT (OUTPATIENT)
Age: 68
End: 2023-09-18

## 2023-09-18 VITALS
TEMPERATURE: 97.8 F | WEIGHT: 153 LBS | HEIGHT: 69 IN | SYSTOLIC BLOOD PRESSURE: 130 MMHG | BODY MASS INDEX: 22.66 KG/M2 | DIASTOLIC BLOOD PRESSURE: 86 MMHG | HEART RATE: 71 BPM

## 2023-09-18 DIAGNOSIS — I87.2 VENOUS INSUFFICIENCY (CHRONIC) (PERIPHERAL): ICD-10-CM

## 2023-09-18 PROCEDURE — 99204 OFFICE O/P NEW MOD 45 MIN: CPT

## 2023-09-18 PROCEDURE — 93923 UPR/LXTR ART STDY 3+ LVLS: CPT

## 2023-09-18 PROCEDURE — 93970 EXTREMITY STUDY: CPT

## 2023-09-25 ENCOUNTER — NON-APPOINTMENT (OUTPATIENT)
Age: 68
End: 2023-09-25

## 2023-09-26 ENCOUNTER — APPOINTMENT (OUTPATIENT)
Dept: MRI IMAGING | Facility: CLINIC | Age: 68
End: 2023-09-26

## 2023-10-02 ENCOUNTER — APPOINTMENT (OUTPATIENT)
Dept: PAIN MANAGEMENT | Facility: CLINIC | Age: 68
End: 2023-10-02
Payer: MEDICARE

## 2023-10-02 VITALS
HEART RATE: 65 BPM | DIASTOLIC BLOOD PRESSURE: 76 MMHG | WEIGHT: 153 LBS | BODY MASS INDEX: 22.66 KG/M2 | SYSTOLIC BLOOD PRESSURE: 137 MMHG | HEIGHT: 69 IN

## 2023-10-02 PROCEDURE — 99214 OFFICE O/P EST MOD 30 MIN: CPT

## 2023-10-07 ENCOUNTER — TRANSCRIPTION ENCOUNTER (OUTPATIENT)
Age: 68
End: 2023-10-07

## 2023-10-18 ENCOUNTER — APPOINTMENT (OUTPATIENT)
Dept: ELECTROPHYSIOLOGY | Facility: CLINIC | Age: 68
End: 2023-10-18
Payer: MEDICARE

## 2023-10-18 ENCOUNTER — NON-APPOINTMENT (OUTPATIENT)
Age: 68
End: 2023-10-18

## 2023-10-19 PROCEDURE — G2066: CPT

## 2023-10-19 PROCEDURE — 93298 REM INTERROG DEV EVAL SCRMS: CPT

## 2023-11-07 ENCOUNTER — LABORATORY RESULT (OUTPATIENT)
Age: 68
End: 2023-11-07

## 2023-11-07 ENCOUNTER — APPOINTMENT (OUTPATIENT)
Dept: RHEUMATOLOGY | Facility: CLINIC | Age: 68
End: 2023-11-07
Payer: MEDICARE

## 2023-11-07 VITALS
HEIGHT: 69 IN | HEART RATE: 91 BPM | WEIGHT: 151 LBS | BODY MASS INDEX: 22.36 KG/M2 | RESPIRATION RATE: 16 BRPM | TEMPERATURE: 98.3 F | DIASTOLIC BLOOD PRESSURE: 84 MMHG | SYSTOLIC BLOOD PRESSURE: 130 MMHG | OXYGEN SATURATION: 98 %

## 2023-11-07 DIAGNOSIS — I73.9 PERIPHERAL VASCULAR DISEASE, UNSPECIFIED: ICD-10-CM

## 2023-11-07 DIAGNOSIS — Z86.79 PERSONAL HISTORY OF OTHER DISEASES OF THE CIRCULATORY SYSTEM: ICD-10-CM

## 2023-11-07 DIAGNOSIS — G62.9 POLYNEUROPATHY, UNSPECIFIED: ICD-10-CM

## 2023-11-07 DIAGNOSIS — Z81.8 FAMILY HISTORY OF OTHER MENTAL AND BEHAVIORAL DISORDERS: ICD-10-CM

## 2023-11-07 DIAGNOSIS — E78.00 PURE HYPERCHOLESTEROLEMIA, UNSPECIFIED: ICD-10-CM

## 2023-11-07 DIAGNOSIS — Z83.79 FAMILY HISTORY OF OTHER DISEASES OF THE DIGESTIVE SYSTEM: ICD-10-CM

## 2023-11-07 DIAGNOSIS — Z80.52 FAMILY HISTORY OF MALIGNANT NEOPLASM OF BLADDER: ICD-10-CM

## 2023-11-07 DIAGNOSIS — M79.18 MYALGIA, OTHER SITE: ICD-10-CM

## 2023-11-07 DIAGNOSIS — Z82.0 FAMILY HISTORY OF EPILEPSY AND OTHER DISEASES OF THE NERVOUS SYSTEM: ICD-10-CM

## 2023-11-07 PROCEDURE — 99204 OFFICE O/P NEW MOD 45 MIN: CPT

## 2023-11-17 ENCOUNTER — TRANSCRIPTION ENCOUNTER (OUTPATIENT)
Age: 68
End: 2023-11-17

## 2023-11-17 RX ORDER — NALTREXONE 100 %
POWDER (GRAM) MISCELLANEOUS
Qty: 90 | Refills: 3 | Status: ACTIVE | COMMUNITY
Start: 2023-10-07 | End: 1900-01-01

## 2023-11-22 ENCOUNTER — APPOINTMENT (OUTPATIENT)
Dept: ELECTROPHYSIOLOGY | Facility: CLINIC | Age: 68
End: 2023-11-22
Payer: MEDICARE

## 2023-11-22 ENCOUNTER — NON-APPOINTMENT (OUTPATIENT)
Age: 68
End: 2023-11-22

## 2023-11-23 PROCEDURE — G2066: CPT

## 2023-11-23 PROCEDURE — 93298 REM INTERROG DEV EVAL SCRMS: CPT | Mod: NC

## 2023-11-24 LAB
ALBUMIN MFR SERPL ELPH: 61.5 %
ALBUMIN SERPL ELPH-MCNC: 4.5 G/DL
ALBUMIN SERPL-MCNC: 4.2 G/DL
ALBUMIN/GLOB SERPL: 1.6 RATIO
ALP BLD-CCNC: 52 U/L
ALPHA1 GLOB MFR SERPL ELPH: 4.5 %
ALPHA1 GLOB SERPL ELPH-MCNC: 0.3 G/DL
ALPHA2 GLOB MFR SERPL ELPH: 9.6 %
ALPHA2 GLOB SERPL ELPH-MCNC: 0.7 G/DL
ALT SERPL-CCNC: 20 U/L
ANION GAP SERPL CALC-SCNC: 10 MMOL/L
AST SERPL-CCNC: 19 U/L
B-GLOBULIN MFR SERPL ELPH: 9.9 %
B-GLOBULIN SERPL ELPH-MCNC: 0.7 G/DL
B2 GLYCOPROT1 IGA SERPL IA-ACNC: <5 SAU
B2 GLYCOPROT1 IGG SER-ACNC: <5 SGU
B2 GLYCOPROT1 IGM SER-ACNC: <5 SMU
BASOPHILS # BLD AUTO: 0.04 K/UL
BASOPHILS NFR BLD AUTO: 0.7 %
BILIRUB SERPL-MCNC: 0.3 MG/DL
BUN SERPL-MCNC: 25 MG/DL
C3 SERPL-MCNC: 108 MG/DL
C4 SERPL-MCNC: 29 MG/DL
CALCIUM SERPL-MCNC: 9.5 MG/DL
CARDIOLIPIN IGM SER-MCNC: 5.2 GPL
CARDIOLIPIN IGM SER-MCNC: <5 MPL
CHLORIDE SERPL-SCNC: 107 MMOL/L
CK SERPL-CCNC: 69 U/L
CO2 SERPL-SCNC: 26 MMOL/L
CREAT SERPL-MCNC: 1.36 MG/DL
CRP SERPL-MCNC: <3 MG/L
DEPRECATED CARDIOLIPIN IGA SER: <5 APL
DEPRECATED KAPPA LC FREE/LAMBDA SER: 1.74 RATIO
EGFR: 56 ML/MIN/1.73M2
EOSINOPHIL # BLD AUTO: 0.17 K/UL
EOSINOPHIL NFR BLD AUTO: 3.2 %
ERYTHROCYTE [SEDIMENTATION RATE] IN BLOOD BY WESTERGREN METHOD: 3 MM/HR
GAMMA GLOB FLD ELPH-MCNC: 1 G/DL
GAMMA GLOB MFR SERPL ELPH: 14.5 %
GLUCOSE SERPL-MCNC: 125 MG/DL
HCT VFR BLD CALC: 42.4 %
HCV AB SER QL: NONREACTIVE
HCV S/CO RATIO: 0.19 S/CO
HGB BLD-MCNC: 14.2 G/DL
IGA SER QL IEP: 139 MG/DL
IGG SER QL IEP: 1089 MG/DL
IGM SER QL IEP: 42 MG/DL
IMM GRANULOCYTES NFR BLD AUTO: 0.2 %
INTERPRETATION SERPL IEP-IMP: NORMAL
KAPPA LC CSF-MCNC: 1.68 MG/DL
KAPPA LC SERPL-MCNC: 2.92 MG/DL
LYMPHOCYTES # BLD AUTO: 1.01 K/UL
LYMPHOCYTES NFR BLD AUTO: 18.7 %
M PROTEIN SPEC IFE-MCNC: NORMAL
MAN DIFF?: NORMAL
MCHC RBC-ENTMCNC: 32.1 PG
MCHC RBC-ENTMCNC: 33.5 GM/DL
MCV RBC AUTO: 95.9 FL
MONOCYTES # BLD AUTO: 0.34 K/UL
MONOCYTES NFR BLD AUTO: 6.3 %
NEUTROPHILS # BLD AUTO: 3.82 K/UL
NEUTROPHILS NFR BLD AUTO: 70.9 %
PLATELET # BLD AUTO: 250 K/UL
POTASSIUM SERPL-SCNC: 4.1 MMOL/L
PROT SERPL-MCNC: 6.9 G/DL
RBC # BLD: 4.42 M/UL
RBC # FLD: 13.2 %
SODIUM SERPL-SCNC: 144 MMOL/L
WBC # FLD AUTO: 5.39 K/UL

## 2023-11-27 ENCOUNTER — TRANSCRIPTION ENCOUNTER (OUTPATIENT)
Age: 68
End: 2023-11-27

## 2023-12-04 ENCOUNTER — NON-APPOINTMENT (OUTPATIENT)
Age: 68
End: 2023-12-04

## 2023-12-08 ENCOUNTER — APPOINTMENT (OUTPATIENT)
Dept: NEPHROLOGY | Facility: CLINIC | Age: 68
End: 2023-12-08
Payer: MEDICARE

## 2023-12-08 PROCEDURE — 99204 OFFICE O/P NEW MOD 45 MIN: CPT | Mod: 95

## 2023-12-11 ENCOUNTER — NON-APPOINTMENT (OUTPATIENT)
Age: 68
End: 2023-12-11

## 2023-12-11 LAB
ALBUMIN SERPL ELPH-MCNC: 4.2 G/DL
ANION GAP SERPL CALC-SCNC: 12 MMOL/L
APPEARANCE: CLEAR
BACTERIA: NEGATIVE /HPF
BILIRUBIN URINE: NEGATIVE
BLOOD URINE: NEGATIVE
BUN SERPL-MCNC: 21 MG/DL
CALCIUM SERPL-MCNC: 9.1 MG/DL
CAST: 0 /LPF
CHLORIDE SERPL-SCNC: 107 MMOL/L
CO2 SERPL-SCNC: 23 MMOL/L
COLOR: YELLOW
CREAT SERPL-MCNC: 1.15 MG/DL
CREAT SPEC-SCNC: 90 MG/DL
CYSTATIN C SERPL-MCNC: 1.19 MG/L
EGFR: 68 ML/MIN/1.73M2
EPITHELIAL CELLS: 0 /HPF
GFR/BSA.PRED SERPLBLD CYS-BASED-ARV: 59 ML/MIN/1.73M2
GLUCOSE QUALITATIVE U: NEGATIVE MG/DL
GLUCOSE SERPL-MCNC: 101 MG/DL
KETONES URINE: NEGATIVE MG/DL
LEUKOCYTE ESTERASE URINE: NEGATIVE
MICROALBUMIN 24H UR DL<=1MG/L-MCNC: 3.9 MG/DL
MICROALBUMIN/CREAT 24H UR-RTO: 43 MG/G
MICROSCOPIC-UA: NORMAL
NITRITE URINE: NEGATIVE
PH URINE: 6
PHOSPHATE SERPL-MCNC: 3.3 MG/DL
POTASSIUM SERPL-SCNC: 3.9 MMOL/L
PROTEIN URINE: NEGATIVE MG/DL
RED BLOOD CELLS URINE: 1 /HPF
SODIUM SERPL-SCNC: 142 MMOL/L
SPECIFIC GRAVITY URINE: 1.02
UROBILINOGEN URINE: 0.2 MG/DL
WHITE BLOOD CELLS URINE: 0 /HPF

## 2023-12-14 ENCOUNTER — NON-APPOINTMENT (OUTPATIENT)
Age: 68
End: 2023-12-14

## 2023-12-14 ENCOUNTER — APPOINTMENT (OUTPATIENT)
Dept: ULTRASOUND IMAGING | Facility: CLINIC | Age: 68
End: 2023-12-14
Payer: MEDICARE

## 2023-12-14 PROCEDURE — 76775 US EXAM ABDO BACK WALL LIM: CPT

## 2023-12-27 ENCOUNTER — NON-APPOINTMENT (OUTPATIENT)
Age: 68
End: 2023-12-27

## 2023-12-27 ENCOUNTER — APPOINTMENT (OUTPATIENT)
Dept: ELECTROPHYSIOLOGY | Facility: CLINIC | Age: 68
End: 2023-12-27
Payer: MEDICARE

## 2023-12-28 PROCEDURE — G2066: CPT

## 2023-12-28 PROCEDURE — 93298 REM INTERROG DEV EVAL SCRMS: CPT

## 2024-01-24 ENCOUNTER — APPOINTMENT (OUTPATIENT)
Dept: VASCULAR SURGERY | Facility: CLINIC | Age: 69
End: 2024-01-24
Payer: MEDICARE

## 2024-01-24 DIAGNOSIS — I83.893 VARICOSE VEINS OF BILATERAL LOWER EXTREMITIES WITH OTHER COMPLICATIONS: ICD-10-CM

## 2024-01-24 PROCEDURE — 99442: CPT | Mod: 93

## 2024-01-24 NOTE — REASON FOR VISIT
[Home] : at home, [unfilled] , at the time of the visit. [Other:____] : [unfilled] [Medical Office: ([facility])___] : at the medical office located in  [Verbal consent obtained from patient] : the patient, [unfilled] [FreeTextEntry1] : pt  states no new c/o

## 2024-01-24 NOTE — ASSESSMENT
[FreeTextEntry1] : Impression le c/o from combo of v mild arterial insuff , symptomatic venous insuff not yet sono evident and neurogenic leg pain component /neuropathy   Plan Med Conservative management leg elevation, knee high compression stockings 15-20 or 20-30  mm Hg, d/w pt trial pletal however given sx i do not believe it will help w the neuropathy sx advised pt to f/u w rheumatologist to re eval if there is a concern for autoimmune condition f/u w Dr KIKE Hicks ov in 6 mo to re eval July 2024 Telephonic visit  time duration  12 min    Varicose veins are enlarged, twisted veins. Varicose veins are caused by increased blood pressure in the veins. The blood moves towards the heart by 1-way valves in the veins. When the valves become weakened or damaged, blood can collect in the veins and pool in your lower legs (ankles). This causes the veins to become enlarged and incompetent with reflux. Sitting or standing for long periods can cause blood to pool in the leg veins, increasing the pressure within the veins. Risk factors for varicose veins or venous disease may include: obesity, older age, standing or sitting for prolonged periods of time for several years, being female, pregnancy, taking oral contraceptive pills or hormone replacement, being inactive, and/or smoking. The most common symptoms of varicose veins are sensations in the legs, such as a heavy feeling, burning, and/or aching. However, each individual may experience symptoms differently. Other symptoms may include: color changes in the skin, sores on the legs, or rash. Severe varicose veins or venous disease may eventually produce long-term mild swelling that can result in more serious skin and tissue problems, such as ulcers and non-healing sores. Varicose veins and venous disease are diagnosed by a complete medical history, physical examination, and diagnostic studies for varicose veins including duplex ultrasound and color-flow imaging. Medical treatment for varicose veins and venous disease include: compression stockings, sclerotherapy, endovenous ablation and/or surgical treatment with microphlebectomy.   [Arterial/Venous Disease] : arterial/venous disease [Other: _____] : [unfilled]

## 2024-01-24 NOTE — HISTORY OF PRESENT ILLNESS
[FreeTextEntry1] : Pt c/o bilateral leg pain swelling heaviness and discomfort worse w activity and by the end of the day worse on the left leg Onset 2019  Intensity moderate  Pt denies recent injury, travel or thrombophilic event   Pt  states occasional  leg and foot nocturnal cramps   Pt denies any recent  cardiac c/o , SOB, Chest Pain or recent heart attacks  Pt is a actice cyclist    Pt states mother w hx of [place]  PT   was dx w Marfanoid features  pt states  prev w/u for neuropathy w/o a dx  pt states le neuropathy c/o are much more sig than the  leg and foot nocturnal cramps  [de-identified] : pt is intermittently compliant w comp stockings pt states no new c/o

## 2024-01-24 NOTE — PHYSICAL EXAM
[Alert] : alert [Oriented to Person] : oriented to person [Oriented to Place] : oriented to place [Oriented to Time] : oriented to time [Calm] : calm [de-identified] : no resp distress [de-identified] : cooperative [FreeTextEntry1] : Physical exam findings via telephonic review with patient

## 2024-01-24 NOTE — DATA REVIEWED
[FreeTextEntry1] : 9/18/2023  TASIA/PVR RLE mild infra inguinal  and LLE mild infra inguinal arterial insuff w vessel calcification                                 Rt TASIA  1.45 Lt TASIA  1.37                                  9/18/2023 Venous Doppler Rashaad LE  no acute dvt svt                            RLE GSV no sono evidence of reflux                            LLE GSV  no sono evidence of reflux

## 2024-01-31 ENCOUNTER — NON-APPOINTMENT (OUTPATIENT)
Age: 69
End: 2024-01-31

## 2024-01-31 ENCOUNTER — APPOINTMENT (OUTPATIENT)
Dept: ELECTROPHYSIOLOGY | Facility: CLINIC | Age: 69
End: 2024-01-31
Payer: MEDICARE

## 2024-02-01 PROCEDURE — 93298 REM INTERROG DEV EVAL SCRMS: CPT

## 2024-02-06 ENCOUNTER — TRANSCRIPTION ENCOUNTER (OUTPATIENT)
Age: 69
End: 2024-02-06

## 2024-03-11 NOTE — DISCUSSION/SUMMARY
[FreeTextEntry1] : Doing well post ablation.  No symptomatic or asymptomatic arrhythmia (on ILR).   As the plan has previously been laid out, based on shared decision making he is being followed off of oral anticoagulation using the ILR to assess for asymptomatic recurrence.  I again cautioned him about the limitations, specifically the sensitivity/specificity of the algorithm, ie may miss flutter, as well as any potential delay in identification/notification.  Nonetheless he prefers this plan.    He does demonstrate PVCs of an outflow tract site of origin which seem to be coming more frequent (5%).   However given his lack of symptoms we will continue to observe for now.  He continues to NOT show AF.    Continue current therapy.

## 2024-03-11 NOTE — HISTORY OF PRESENT ILLNESS
[FreeTextEntry1] : Overall he feels well. No chest pain. No shortness of breath. No lightheadedness/dizziness.   He is riding > 150 miles /week.

## 2024-03-11 NOTE — PHYSICAL EXAM
[Well Developed] : well developed [Well Nourished] : well nourished [Normal Conjunctiva] : normal conjunctiva [No Acute Distress] : no acute distress [Normal Venous Pressure] : normal venous pressure [No Carotid Bruit] : no carotid bruit [No Rub] : no rub [No Murmur] : no murmur [No Gallop] : no gallop [Clear Lung Fields] : clear lung fields [Good Air Entry] : good air entry [No Respiratory Distress] : no respiratory distress  [Non Tender] : non-tender [Soft] : abdomen soft [Normal Bowel Sounds] : normal bowel sounds [No Masses/organomegaly] : no masses/organomegaly [No Edema] : no edema [Normal Gait] : normal gait [No Cyanosis] : no cyanosis [No Clubbing] : no clubbing [No Varicosities] : no varicosities [No Rash] : no rash [No Skin Lesions] : no skin lesions [No Focal Deficits] : no focal deficits [Moves all extremities] : moves all extremities [Normal Speech] : normal speech [Alert and Oriented] : alert and oriented [Normal memory] : normal memory [de-identified] : regular with premature beats

## 2024-03-12 ENCOUNTER — APPOINTMENT (OUTPATIENT)
Dept: ELECTROPHYSIOLOGY | Facility: CLINIC | Age: 69
End: 2024-03-12
Payer: MEDICARE

## 2024-03-12 VITALS
OXYGEN SATURATION: 96 % | SYSTOLIC BLOOD PRESSURE: 117 MMHG | HEART RATE: 88 BPM | DIASTOLIC BLOOD PRESSURE: 76 MMHG | WEIGHT: 155 LBS | BODY MASS INDEX: 22.89 KG/M2

## 2024-03-12 DIAGNOSIS — R00.2 PALPITATIONS: ICD-10-CM

## 2024-03-12 DIAGNOSIS — I48.91 UNSPECIFIED ATRIAL FIBRILLATION: ICD-10-CM

## 2024-03-12 PROCEDURE — 93285 PRGRMG DEV EVAL SCRMS IP: CPT

## 2024-03-12 PROCEDURE — 99213 OFFICE O/P EST LOW 20 MIN: CPT

## 2024-03-12 RX ORDER — ROSUVASTATIN CALCIUM 20 MG/1
20 TABLET, FILM COATED ORAL DAILY
Qty: 90 | Refills: 3 | Status: ACTIVE | COMMUNITY
Start: 2024-03-12

## 2024-03-12 RX ORDER — PRAVASTATIN SODIUM 40 MG/1
40 TABLET ORAL
Qty: 30 | Refills: 0 | Status: DISCONTINUED | COMMUNITY
End: 2024-03-12

## 2024-04-16 ENCOUNTER — NON-APPOINTMENT (OUTPATIENT)
Age: 69
End: 2024-04-16

## 2024-04-16 ENCOUNTER — APPOINTMENT (OUTPATIENT)
Dept: ELECTROPHYSIOLOGY | Facility: CLINIC | Age: 69
End: 2024-04-16
Payer: MEDICARE

## 2024-04-16 PROCEDURE — 93298 REM INTERROG DEV EVAL SCRMS: CPT

## 2024-04-30 ENCOUNTER — TRANSCRIPTION ENCOUNTER (OUTPATIENT)
Age: 69
End: 2024-04-30

## 2024-05-01 ENCOUNTER — TRANSCRIPTION ENCOUNTER (OUTPATIENT)
Age: 69
End: 2024-05-01

## 2024-05-20 ENCOUNTER — NON-APPOINTMENT (OUTPATIENT)
Age: 69
End: 2024-05-20

## 2024-05-20 ENCOUNTER — APPOINTMENT (OUTPATIENT)
Dept: ELECTROPHYSIOLOGY | Facility: CLINIC | Age: 69
End: 2024-05-20
Payer: MEDICARE

## 2024-05-20 PROCEDURE — 93298 REM INTERROG DEV EVAL SCRMS: CPT

## 2024-06-14 ENCOUNTER — APPOINTMENT (OUTPATIENT)
Dept: NEPHROLOGY | Facility: CLINIC | Age: 69
End: 2024-06-14
Payer: MEDICARE

## 2024-06-14 DIAGNOSIS — R79.89 OTHER SPECIFIED ABNORMAL FINDINGS OF BLOOD CHEMISTRY: ICD-10-CM

## 2024-06-14 LAB
ALBUMIN SERPL ELPH-MCNC: 4.2 G/DL
ANION GAP SERPL CALC-SCNC: 11 MMOL/L
BUN SERPL-MCNC: 23 MG/DL
CALCIUM SERPL-MCNC: 9 MG/DL
CHLORIDE SERPL-SCNC: 105 MMOL/L
CO2 SERPL-SCNC: 23 MMOL/L
CREAT SERPL-MCNC: 1.33 MG/DL
EGFR: 57 ML/MIN/1.73M2
GLUCOSE SERPL-MCNC: 90 MG/DL
PHOSPHATE SERPL-MCNC: 3 MG/DL
POTASSIUM SERPL-SCNC: 4 MMOL/L
SODIUM SERPL-SCNC: 138 MMOL/L

## 2024-06-14 PROCEDURE — 99213 OFFICE O/P EST LOW 20 MIN: CPT

## 2024-06-14 RX ORDER — TOPIRAMATE 100 MG/1
100 CAPSULE, EXTENDED RELEASE ORAL
Qty: 90 | Refills: 3 | Status: DISCONTINUED | COMMUNITY
Start: 2017-10-02 | End: 2024-06-14

## 2024-06-14 RX ORDER — TAMSULOSIN HYDROCHLORIDE 0.4 MG/1
0.4 CAPSULE ORAL
Qty: 21 | Refills: 0 | Status: DISCONTINUED | COMMUNITY
Start: 2024-03-12 | End: 2024-06-14

## 2024-06-14 NOTE — REVIEW OF SYSTEMS
[Fever] : no fever [Feeling Poorly] : not feeling poorly [Chest Pain] : no chest pain [Shortness Of Breath] : no shortness of breath [Abdominal Pain] : no abdominal pain [As Noted in HPI] : as noted in HPI [Anxiety] : no anxiety [Depression] : no depression [Easy Bleeding] : no tendency for easy bleeding [Easy Bruising] : no tendency for easy bruising [de-identified] : chronic migrain, peripheral neuropathy

## 2024-06-14 NOTE — REASON FOR VISIT
[Home] : at home, [unfilled] , at the time of the visit. [Medical Office: ([facility])___] : at the medical office located in  [Patient] : the patient [Follow-Up] : a follow-up visit

## 2024-06-14 NOTE — ASSESSMENT
[FreeTextEntry1] : 68 y/o man hear for evaluation of an elevated creatinine  Elevated Serum Creatinine -- The patient has an elevated creatinine going back years as per his report it is always 1.1. to 1.3.  Most recently this week it was 1.33.  Hard to know in his case if his creatinine is accurately reflecting his GFR as he is 68 but in very good physical shape.  Combined cystatin c and creatinine yield egfr of 67. with 40 mg/g albuminuria 4 variable kfre is 0.15% at 5 years. low risk overall.  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  Will repeat blood and urine tests in advance of next visit.    The time spent is inclusive of the time it took to see, evaluate, and manage the patient.  Time is inclusive of the time taken to review chart, reconcile medications, document findings, and communicate with other providers (when applicable).

## 2024-06-14 NOTE — HISTORY OF PRESENT ILLNESS
[FreeTextEntry1] : 12/8/23 -- Today I had the pleasure of meeting Hector Medrano.  A fascinating man who is currently retired but had a long career in science and science education.  He is an avid cyclist.  His wife is also my patient.  He is here today for evaluation of a mildly elevated creatinine.  His medical history includes afib s/p ablation and chronic migraines as well as unexplained peripheral neuropathy.  He reports that his creatinine has always been about 1.1 to 1.3 but mostly 1.1.  This is going back a number of year.  He has BP but no other urinary symptoms such as hematuria.  Overall feels well today.  6/14/24 -- Seen and evaluated today by tele visit.  feels well denies complaints.

## 2024-06-21 ENCOUNTER — TRANSCRIPTION ENCOUNTER (OUTPATIENT)
Age: 69
End: 2024-06-21

## 2024-06-24 ENCOUNTER — APPOINTMENT (OUTPATIENT)
Dept: ELECTROPHYSIOLOGY | Facility: CLINIC | Age: 69
End: 2024-06-24
Payer: MEDICARE

## 2024-06-24 ENCOUNTER — TRANSCRIPTION ENCOUNTER (OUTPATIENT)
Age: 69
End: 2024-06-24

## 2024-06-24 ENCOUNTER — NON-APPOINTMENT (OUTPATIENT)
Age: 69
End: 2024-06-24

## 2024-06-24 PROCEDURE — 93298 REM INTERROG DEV EVAL SCRMS: CPT

## 2024-06-26 ENCOUNTER — TRANSCRIPTION ENCOUNTER (OUTPATIENT)
Age: 69
End: 2024-06-26

## 2024-07-26 ENCOUNTER — RX RENEWAL (OUTPATIENT)
Age: 69
End: 2024-07-26

## 2024-07-26 ENCOUNTER — APPOINTMENT (OUTPATIENT)
Dept: ELECTROPHYSIOLOGY | Facility: CLINIC | Age: 69
End: 2024-07-26
Payer: MEDICARE

## 2024-07-26 ENCOUNTER — NON-APPOINTMENT (OUTPATIENT)
Age: 69
End: 2024-07-26

## 2024-07-26 PROCEDURE — 93298 REM INTERROG DEV EVAL SCRMS: CPT

## 2024-08-26 ENCOUNTER — APPOINTMENT (OUTPATIENT)
Dept: ELECTROPHYSIOLOGY | Facility: CLINIC | Age: 69
End: 2024-08-26

## 2024-08-26 PROCEDURE — 93298 REM INTERROG DEV EVAL SCRMS: CPT

## 2024-09-18 ENCOUNTER — APPOINTMENT (OUTPATIENT)
Dept: OPHTHALMOLOGY | Facility: CLINIC | Age: 69
End: 2024-09-18
Payer: MEDICARE

## 2024-09-18 ENCOUNTER — NON-APPOINTMENT (OUTPATIENT)
Age: 69
End: 2024-09-18

## 2024-09-18 PROCEDURE — 92014 COMPRE OPH EXAM EST PT 1/>: CPT

## 2024-09-23 ENCOUNTER — NON-APPOINTMENT (OUTPATIENT)
Age: 69
End: 2024-09-23

## 2024-09-24 ENCOUNTER — APPOINTMENT (OUTPATIENT)
Dept: ELECTROPHYSIOLOGY | Facility: CLINIC | Age: 69
End: 2024-09-24
Payer: MEDICARE

## 2024-09-24 ENCOUNTER — NON-APPOINTMENT (OUTPATIENT)
Age: 69
End: 2024-09-24

## 2024-09-24 VITALS
HEART RATE: 58 BPM | BODY MASS INDEX: 22.96 KG/M2 | DIASTOLIC BLOOD PRESSURE: 76 MMHG | HEIGHT: 69 IN | OXYGEN SATURATION: 100 % | WEIGHT: 155 LBS | SYSTOLIC BLOOD PRESSURE: 121 MMHG

## 2024-09-24 DIAGNOSIS — I49.3 VENTRICULAR PREMATURE DEPOLARIZATION: ICD-10-CM

## 2024-09-24 PROCEDURE — 99215 OFFICE O/P EST HI 40 MIN: CPT

## 2024-09-24 PROCEDURE — 93291 INTERROG DEV EVAL SCRMS IP: CPT

## 2024-09-25 NOTE — CARDIOLOGY SUMMARY
[___] : [unfilled] [de-identified] : today: Sinus Rhythm -Frequent pvcs -ventricular trigeminy    [de-identified] : ILR with no AF 5% PVC

## 2024-09-25 NOTE — HISTORY OF PRESENT ILLNESS
[FreeTextEntry1] : Overall he feels well. No chest pain. No shortness of breath. No lightheadedness/dizziness.   He is riding > 200 miles /week.  New change of Rosuvastatin 20mg daily from pravastatin 40mg. Endorses occasions where he is riding his bike and has times where on his heart rate monitor it shows his heart rate "jump up" for 6 seconds or so and then drops back down. Has happened twice. No lightheadedness/dizziness.  NO change in exercise tolerance.

## 2024-09-25 NOTE — PHYSICAL EXAM

## 2024-09-25 NOTE — DISCUSSION/SUMMARY
[FreeTextEntry1] : Doing well post ablation for atrial arrhythmia.  No symptomatic or asymptomatic arrhythmia (on ILR).   As the plan has previously been laid out, based on shared decision making he is being followed off of oral anticoagulation using the ILR to assess for asymptomatic recurrence.  I again cautioned him about the limitations, specifically the sensitivity/specificity of the algorithm, ie may miss flutter, as well as any potential delay in identification/notification.  Nonetheless he prefers this plan.    He is now demonstrating an increase in burden of ventricular ectopy/PVCs.  The ectopy is of an outflow tract site of origin which seem to be coming more frequent (5%).   We discussed that the dyssynchrony and unfavorable hemodynamics may potentiate atrial arrhythmia.  We discussed EP testing and ablation.

## 2024-09-25 NOTE — CARDIOLOGY SUMMARY
[___] : [unfilled] [de-identified] : today: Sinus Rhythm -Frequent pvcs -ventricular trigeminy    [de-identified] : ILR with no AF 5% PVC

## 2024-09-25 NOTE — PHYSICAL EXAM

## 2024-10-02 ENCOUNTER — NON-APPOINTMENT (OUTPATIENT)
Age: 69
End: 2024-10-02

## 2024-10-02 ENCOUNTER — APPOINTMENT (OUTPATIENT)
Dept: PAIN MANAGEMENT | Facility: CLINIC | Age: 69
End: 2024-10-02
Payer: MEDICARE

## 2024-10-02 VITALS
HEIGHT: 69 IN | BODY MASS INDEX: 22.96 KG/M2 | WEIGHT: 155 LBS | SYSTOLIC BLOOD PRESSURE: 122 MMHG | DIASTOLIC BLOOD PRESSURE: 78 MMHG | HEART RATE: 60 BPM

## 2024-10-02 PROCEDURE — 99214 OFFICE O/P EST MOD 30 MIN: CPT

## 2024-10-29 ENCOUNTER — APPOINTMENT (OUTPATIENT)
Dept: ELECTROPHYSIOLOGY | Facility: CLINIC | Age: 69
End: 2024-10-29

## 2024-10-29 PROCEDURE — 93298 REM INTERROG DEV EVAL SCRMS: CPT

## 2024-12-03 ENCOUNTER — NON-APPOINTMENT (OUTPATIENT)
Age: 69
End: 2024-12-03

## 2024-12-03 ENCOUNTER — APPOINTMENT (OUTPATIENT)
Dept: ELECTROPHYSIOLOGY | Facility: CLINIC | Age: 69
End: 2024-12-03

## 2024-12-03 PROCEDURE — 93298 REM INTERROG DEV EVAL SCRMS: CPT

## 2024-12-04 ENCOUNTER — TRANSCRIPTION ENCOUNTER (OUTPATIENT)
Age: 69
End: 2024-12-04

## 2024-12-13 ENCOUNTER — APPOINTMENT (OUTPATIENT)
Dept: NEPHROLOGY | Facility: CLINIC | Age: 69
End: 2024-12-13
Payer: MEDICARE

## 2024-12-13 DIAGNOSIS — R79.89 OTHER SPECIFIED ABNORMAL FINDINGS OF BLOOD CHEMISTRY: ICD-10-CM

## 2024-12-13 PROCEDURE — 99213 OFFICE O/P EST LOW 20 MIN: CPT

## 2024-12-16 ENCOUNTER — OUTPATIENT (OUTPATIENT)
Dept: OUTPATIENT SERVICES | Facility: HOSPITAL | Age: 69
LOS: 1 days | End: 2024-12-16
Payer: MEDICARE

## 2024-12-16 VITALS
SYSTOLIC BLOOD PRESSURE: 132 MMHG | HEIGHT: 69 IN | WEIGHT: 154.98 LBS | TEMPERATURE: 98 F | DIASTOLIC BLOOD PRESSURE: 87 MMHG | RESPIRATION RATE: 16 BRPM | HEART RATE: 65 BPM | OXYGEN SATURATION: 100 %

## 2024-12-16 DIAGNOSIS — Z98.890 OTHER SPECIFIED POSTPROCEDURAL STATES: Chronic | ICD-10-CM

## 2024-12-16 DIAGNOSIS — I49.9 CARDIAC ARRHYTHMIA, UNSPECIFIED: ICD-10-CM

## 2024-12-16 DIAGNOSIS — I49.3 VENTRICULAR PREMATURE DEPOLARIZATION: ICD-10-CM

## 2024-12-16 DIAGNOSIS — Z98.52 VASECTOMY STATUS: Chronic | ICD-10-CM

## 2024-12-16 DIAGNOSIS — Z90.89 ACQUIRED ABSENCE OF OTHER ORGANS: Chronic | ICD-10-CM

## 2024-12-16 LAB
ANION GAP SERPL CALC-SCNC: 10 MMOL/L — SIGNIFICANT CHANGE UP (ref 5–17)
BLD GP AB SCN SERPL QL: NEGATIVE — SIGNIFICANT CHANGE UP
BUN SERPL-MCNC: 19 MG/DL — SIGNIFICANT CHANGE UP (ref 7–23)
CALCIUM SERPL-MCNC: 9.5 MG/DL — SIGNIFICANT CHANGE UP (ref 8.4–10.5)
CHLORIDE SERPL-SCNC: 108 MMOL/L — SIGNIFICANT CHANGE UP (ref 96–108)
CO2 SERPL-SCNC: 21 MMOL/L — LOW (ref 22–31)
CREAT SERPL-MCNC: 1.15 MG/DL — SIGNIFICANT CHANGE UP (ref 0.5–1.3)
EGFR: 68 ML/MIN/1.73M2 — SIGNIFICANT CHANGE UP
GLUCOSE SERPL-MCNC: 81 MG/DL — SIGNIFICANT CHANGE UP (ref 70–99)
HCT VFR BLD CALC: 42.9 % — SIGNIFICANT CHANGE UP (ref 39–50)
HGB BLD-MCNC: 14.8 G/DL — SIGNIFICANT CHANGE UP (ref 13–17)
MCHC RBC-ENTMCNC: 32.3 PG — SIGNIFICANT CHANGE UP (ref 27–34)
MCHC RBC-ENTMCNC: 34.5 G/DL — SIGNIFICANT CHANGE UP (ref 32–36)
MCV RBC AUTO: 93.7 FL — SIGNIFICANT CHANGE UP (ref 80–100)
NRBC # BLD: 0 /100 WBCS — SIGNIFICANT CHANGE UP (ref 0–0)
PLATELET # BLD AUTO: 167 K/UL — SIGNIFICANT CHANGE UP (ref 150–400)
POTASSIUM SERPL-MCNC: 3.8 MMOL/L — SIGNIFICANT CHANGE UP (ref 3.5–5.3)
POTASSIUM SERPL-SCNC: 3.8 MMOL/L — SIGNIFICANT CHANGE UP (ref 3.5–5.3)
RBC # BLD: 4.58 M/UL — SIGNIFICANT CHANGE UP (ref 4.2–5.8)
RBC # FLD: 12.9 % — SIGNIFICANT CHANGE UP (ref 10.3–14.5)
RH IG SCN BLD-IMP: POSITIVE — SIGNIFICANT CHANGE UP
SODIUM SERPL-SCNC: 139 MMOL/L — SIGNIFICANT CHANGE UP (ref 135–145)
WBC # BLD: 3.62 K/UL — LOW (ref 3.8–10.5)
WBC # FLD AUTO: 3.62 K/UL — LOW (ref 3.8–10.5)

## 2024-12-16 PROCEDURE — 86901 BLOOD TYPING SEROLOGIC RH(D): CPT

## 2024-12-16 PROCEDURE — 85027 COMPLETE CBC AUTOMATED: CPT

## 2024-12-16 PROCEDURE — 86850 RBC ANTIBODY SCREEN: CPT

## 2024-12-16 PROCEDURE — 86900 BLOOD TYPING SEROLOGIC ABO: CPT

## 2024-12-16 PROCEDURE — 80048 BASIC METABOLIC PNL TOTAL CA: CPT

## 2024-12-16 PROCEDURE — G0463: CPT

## 2024-12-16 RX ORDER — TOPIRAMATE 25 MG/1
1 TABLET ORAL
Refills: 0 | DISCHARGE

## 2024-12-16 RX ORDER — ROSUVASTATIN CALCIUM 5 MG/1
1 TABLET, FILM COATED ORAL
Refills: 0 | DISCHARGE

## 2024-12-16 NOTE — H&P PST ADULT - NSANTHTOTALSCORECAL_ENT_A_CORE
PROGRESS NOTE    Name: Brian Kendall  Age: 62 year old  Gender: male    Chief Complaint   Patient presents with   • Abdominal Pain       HPI: Patient came here because of the heartburn with the coronary history strong problem last admitted in June 2022/coronary artery disease/hyperlipidemia/hypertension/diabetes mellitus/elevated bilirubin/evaluation  Current Outpatient Medications   Medication Sig Dispense Refill   • omeprazole (PriLOSEC) 40 MG capsule Take 1 capsule by mouth daily. 90 capsule 0   • metformin (GLUCOPHAGE) 1000 MG tablet Take 1 tablet by mouth in the morning and 1 tablet in the evening. Take with meals. 180 tablet 1   • metoPROLOL succinate (TOPROL-XL) 25 MG 24 hr tablet Take 0.5 tablets by mouth daily. 30 tablet 1   • glipiZIDE (GLUCOTROL) 2.5 MG 24 hr tablet Take 1 tablet by mouth daily. 90 tablet 1   • losartan (COZAAR) 25 MG tablet Take 1 tablet by mouth in the morning and 1 tablet in the evening. 180 tablet 1   • rosuvastatin (CRESTOR) 40 MG tablet Take 1 tablet by mouth daily. 90 tablet 3   • clopidogrel (PLAVIX) 75 MG tablet Take 1 tablet by mouth daily. 30 tablet 2   • Continuous Blood Gluc Sensor (FreeStyle Xochitl 2 Sensor) Misc Apply 1 each topically every 14 days. 6 each 3   • Continuous Blood Gluc Sensor (FreeStyle Xochitl 14 Day Sensor) Misc 1 each continuous. 6 each 3   • Continuous Blood Gluc  (FreeStyle Xochitl 2 [place]) Device 1 each every 14 days. 6 each 3   • Multiple Vitamin (Multivitamin Adult) Tab Take 1 tablet by mouth daily.     • Cholecalciferol (Vitamin D) 125 MCG (5000 UT) Cap Take 1 capsule by mouth daily.     • Cyanocobalamin (Vitamin B-12) 5000 MCG SL Tab Place 5,000 mcg under the tongue daily.     • aspirin 81 MG chewable tablet Chew 81 mg by mouth daily.       No current facility-administered medications for this visit.       ALLERGIES:  Patient has no known allergies.    Past Medical History:   Diagnosis Date   • Blood clots in stool    • Coronary  atherosclerosis of native coronary artery    • Diabetes mellitus type II, controlled (CMS/[facility])    • GERD with esophagitis    • Melena    • Mixed hyperlipidemia    • Old MI (myocardial infarction)        Past Surgical History:   Procedure Laterality Date   • Cardiac surgery     • Colonoscopy diagnostic  2016    [facility] - Dr. Moreno   • Coronary artery bypass graft  2002    CABG x4   • Coronary stent placement  01/27/2018    BRENT x2 to previous graft of the left PDA 1/27/2018   • Esophagogastroduodenoscopy transoral flex diag  2016    [facility]        Family History   Problem Relation Age of Onset   • Diabetes Mother    • Cancer Father 51       Social History     Socioeconomic History   • Marital status: /Civil Union     Spouse name: Not on file   • Number of children: Not on file   • Years of education: Not on file   • Highest education level: Not on file   Occupational History   • Not on file   Tobacco Use   • Smoking status: Never Smoker   • Smokeless tobacco: Never Used   Vaping Use   • Vaping Use: never used   Substance and Sexual Activity   • Alcohol use: Never   • Drug use: Never   • Sexual activity: Not on file   Other Topics Concern   • Not on file   Social History Narrative   • Not on file     Social Determinants of Health     Financial Resource Strain: Not on file   Food Insecurity: Not on file   Transportation Needs: Not on file   Physical Activity: Not on file   Stress: Not on file   Social Connections: Not on file   Intimate Partner Violence: Not At Risk   • Social Determinants: Intimate Partner Violence Past Fear: No   • Social Determinants: Intimate Partner Violence Current Fear: No       Current Medical Problem(s)     Review of Systems   Constitutional: Negative for malaise/fatigue and weight loss.   HENT: Negative for congestion, hearing loss and sore throat.    Eyes: Negative for blurred vision.   Respiratory: Negative for cough, shortness of breath and wheezing.    Cardiovascular: Negative for  palpitations, leg swelling and PND.   Gastrointestinal: Negative for abdominal pain, blood in stool, constipation, heartburn, nausea and vomiting.   Genitourinary: Negative for flank pain and hematuria.   Musculoskeletal: Negative for back pain, falls and joint pain.   Neurological: Negative for dizziness, focal weakness, seizures, loss of consciousness and headaches.   Psychiatric/Behavioral: Negative for depression, memory loss and substance abuse.        /69 (BP Location: LUE - Left upper extremity, Patient Position: Sitting, Cuff Size: Regular)   Pulse (!) 59   Temp 97.1 °F (36.2 °C) (Temporal)   Resp 16   Ht 5' 6\" (1.676 m)   Wt 64.9 kg (143 lb)   BMI 23.08 kg/m²   BSA 1.73 m²     Physical Exam  Vitals and nursing note reviewed.   Constitutional:       Appearance: Normal appearance.   HENT:      Head: Normocephalic.      Right Ear: Tympanic membrane normal.      Left Ear: Tympanic membrane, ear canal and external ear normal.      Mouth/Throat:      Pharynx: Oropharynx is clear.   Eyes:      Extraocular Movements: Extraocular movements intact.      Conjunctiva/sclera: Conjunctivae normal.      Pupils: Pupils are equal, round, and reactive to light.   Neck:      Vascular: No carotid bruit.   Cardiovascular:      Rate and Rhythm: Normal rate and regular rhythm.      Pulses: Normal pulses.      Heart sounds: Normal heart sounds.   Pulmonary:      Effort: Pulmonary effort is normal. No respiratory distress.      Breath sounds: Normal breath sounds. No wheezing or rales.   Abdominal:      General: Bowel sounds are normal.      Palpations: Abdomen is soft. There is no mass.      Tenderness: There is no abdominal tenderness. There is no right CVA tenderness or guarding.      Hernia: No hernia is present.   Musculoskeletal:         General: No swelling or tenderness. Normal range of motion.      Cervical back: Normal range of motion and neck supple.   Lymphadenopathy:      Cervical: No cervical adenopathy.    Skin:     General: Skin is warm and dry.      Findings: No lesion or rash.   Neurological:      General: No focal deficit present.      Mental Status: He is alert and oriented to person, place, and time.      Cranial Nerves: No cranial nerve deficit.      Motor: No weakness.      Gait: Gait normal.   Psychiatric:         Mood and Affect: Mood normal.         Behavior: Behavior normal.          Assessment and Plan    Heartburn  Heartburn no chest pain no short of breath no perspiration patient is a elevated troponin severe with the seen by Dr. Coleman and Meggan done in June 2022 and the he has some heartburn feeling but no short of breath he did 20 10,000 steps yesterday and pain in G GI area and so we will start omeprazole EKG nonspecific old changes which is severe bradycardia but no other acute problem and follow-up with Dr. Adkins today    Elevated bilirubin  Last labs shows bilirubin 1.7 patient no other issues will repeat it could be a lab error and A1c was good 6.2    Diabetes mellitus type II, controlled (CMS/[facility])  Patient A1c improved last 1 was 6.2 no chest pain no short of breath taking the medication and he does not take anymore Jardiance and is managing well doing step 10-15,000 a day we will continue current management    Mixed hyperlipidemia  History of hyperlipidemia and on Crestor and doing well no chest pain no short of breath and no joint pain we will continue current management         Electronically signed by:  Clem Adames MD   3

## 2024-12-16 NOTE — H&P PST ADULT - PROBLEM SELECTOR PLAN 1
PVC ablation/ ideopathic VT  PST instructions provided, chlorhexidine wash given, patient verbalized understanding.   CBC, BMP,  T&S collected and sent   Continue medications as prescribed

## 2024-12-16 NOTE — H&P PST ADULT - NSICDXPASTSURGICALHX_GEN_ALL_CORE_FT
PAST SURGICAL HISTORY:  History of cardiac radiofrequency ablation     History of colonoscopy     History of tonsillectomy     History of vasectomy

## 2024-12-16 NOTE — H&P PST ADULT - NSICDXFAMILYHX_GEN_ALL_CORE_FT
FAMILY HISTORY:  Family history of bladder cancer    Grandparent  Still living? No  Congestive heart failure, Age at diagnosis: Age Unknown  Family history of heart attack, Age at diagnosis: Age Unknown

## 2024-12-16 NOTE — H&P PST ADULT - NSANTHOSAYNRD_GEN_A_CORE
No. KIESHA screening performed.  STOP BANG Legend: 0-2 = LOW Risk; 3-4 = INTERMEDIATE Risk; 5-8 = HIGH Risk

## 2024-12-16 NOTE — H&P PST ADULT - ASSESSMENT
Airway : no airway abnormalities , denies prior anesthesia complications   Mallampati : II  Denies loose teeth     Ab abrasion risk : Denies

## 2024-12-16 NOTE — H&P PST ADULT - IN ACCORDANCE WITH [place] LAW, WE OFFER EVERY PATIENT A HEPATITIS C TEST. WOULD YOU LIKE TO BE TESTED TODAY?
Please inform patient that scopolamine patch was prescribed to [facility] at [place].  Please advise patient to apply 1 patch and remove it every 72-hour.  She has to removed the previous old patch before applying a new patch.    Clarisse Toro M.D.    
Opt out

## 2024-12-16 NOTE — H&P PST ADULT - MUSCULOSKELETAL
Camron Weiss, cath lab RN at bedside.  Per Camron Weiss, no need to start heparin infusion d/t d/c in cath lab     Kal Polo RN  02/14/23 [address], RN  02/14/23 [id number] ROM intact details…

## 2024-12-16 NOTE — H&P PST ADULT - HISTORY OF PRESENT ILLNESS
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 Patient is very active , biking 4 x week, loop recorder picked up PVCs/ SVTs. Patient denies CP, palpitations, ARDON, LE edema, no acute complaints. Presents to PST for scheduled PVC ablation / ideopathic VT on 12/23/2024.  abdominal pain abdominal pain

## 2024-12-17 ENCOUNTER — TRANSCRIPTION ENCOUNTER (OUTPATIENT)
Age: 69
End: 2024-12-17

## 2024-12-17 RX ORDER — APIXABAN 5 MG/1
5 TABLET, FILM COATED ORAL
Qty: 60 | Refills: 0 | Status: ACTIVE | COMMUNITY
Start: 2024-12-17 | End: 1900-01-01

## 2024-12-19 ENCOUNTER — NON-APPOINTMENT (OUTPATIENT)
Age: 69
End: 2024-12-19

## 2024-12-20 ENCOUNTER — TRANSCRIPTION ENCOUNTER (OUTPATIENT)
Age: 69
End: 2024-12-20

## 2024-12-23 ENCOUNTER — RESULT REVIEW (OUTPATIENT)
Age: 69
End: 2024-12-23

## 2024-12-23 ENCOUNTER — INPATIENT (INPATIENT)
Facility: HOSPITAL | Age: 69
LOS: 0 days | Discharge: HOME | DRG: 310 | End: 2024-12-24
Attending: INTERNAL MEDICINE | Admitting: INTERNAL MEDICINE
Payer: COMMERCIAL

## 2024-12-23 VITALS — WEIGHT: 154.98 LBS | HEIGHT: 69 IN

## 2024-12-23 DIAGNOSIS — Z98.890 OTHER SPECIFIED POSTPROCEDURAL STATES: Chronic | ICD-10-CM

## 2024-12-23 DIAGNOSIS — I49.3 VENTRICULAR PREMATURE DEPOLARIZATION: ICD-10-CM

## 2024-12-23 DIAGNOSIS — Z90.89 ACQUIRED ABSENCE OF OTHER ORGANS: Chronic | ICD-10-CM

## 2024-12-23 DIAGNOSIS — Z98.52 VASECTOMY STATUS: Chronic | ICD-10-CM

## 2024-12-23 PROCEDURE — 93308 TTE F-UP OR LMTD: CPT | Mod: 26

## 2024-12-23 PROCEDURE — 99291 CRITICAL CARE FIRST HOUR: CPT

## 2024-12-23 PROCEDURE — 93010 ELECTROCARDIOGRAM REPORT: CPT

## 2024-12-23 RX ORDER — OXYCODONE HCL 15 MG
5 TABLET ORAL ONCE
Refills: 0 | Status: DISCONTINUED | OUTPATIENT
Start: 2024-12-23 | End: 2024-12-23

## 2024-12-23 RX ORDER — LIDOCAINE 50 MG/G
1 OINTMENT TOPICAL ONCE
Refills: 0 | Status: COMPLETED | OUTPATIENT
Start: 2024-12-23 | End: 2024-12-23

## 2024-12-23 RX ORDER — TOPIRAMATE 50 MG/1
100 TABLET, COATED ORAL AT BEDTIME
Refills: 0 | Status: DISCONTINUED | OUTPATIENT
Start: 2024-12-23 | End: 2024-12-24

## 2024-12-23 RX ORDER — FINASTERIDE 5 MG
5 TABLET ORAL DAILY
Refills: 0 | Status: DISCONTINUED | OUTPATIENT
Start: 2024-12-23 | End: 2024-12-24

## 2024-12-23 RX ORDER — CHLORHEXIDINE GLUCONATE 1.2 MG/ML
1 RINSE ORAL
Refills: 0 | Status: DISCONTINUED | OUTPATIENT
Start: 2024-12-23 | End: 2024-12-23

## 2024-12-23 RX ORDER — MONTELUKAST SODIUM 10 MG/1
10 TABLET, FILM COATED ORAL AT BEDTIME
Refills: 0 | Status: DISCONTINUED | OUTPATIENT
Start: 2024-12-23 | End: 2024-12-24

## 2024-12-23 RX ORDER — ROSUVASTATIN 40 MG/1
20 TABLET, FILM COATED ORAL AT BEDTIME
Refills: 0 | Status: DISCONTINUED | OUTPATIENT
Start: 2024-12-23 | End: 2024-12-24

## 2024-12-23 RX ORDER — SUMATRIPTAN SUCCINATE 6 MG/0.5ML
100 PEN INJECTOR (ML) SUBCUTANEOUS DAILY
Refills: 0 | Status: DISCONTINUED | OUTPATIENT
Start: 2024-12-23 | End: 2024-12-24

## 2024-12-23 RX ADMIN — ROSUVASTATIN 20 MILLIGRAM(S): 40 TABLET, FILM COATED ORAL at 21:04

## 2024-12-23 RX ADMIN — Medication 5 MILLIGRAM(S): at 13:23

## 2024-12-23 RX ADMIN — Medication 100 MILLIGRAM(S): at 17:13

## 2024-12-23 RX ADMIN — LIDOCAINE 1 PATCH: 50 OINTMENT TOPICAL at 19:28

## 2024-12-23 RX ADMIN — Medication 5 MILLIGRAM(S): at 14:17

## 2024-12-23 RX ADMIN — Medication 5 MILLIGRAM(S): at 16:44

## 2024-12-23 RX ADMIN — MONTELUKAST SODIUM 10 MILLIGRAM(S): 10 TABLET, FILM COATED ORAL at 21:04

## 2024-12-23 RX ADMIN — LIDOCAINE 1 PATCH: 50 OINTMENT TOPICAL at 13:31

## 2024-12-23 RX ADMIN — CHLORHEXIDINE GLUCONATE 1 APPLICATION(S): 1.2 RINSE ORAL at 16:44

## 2024-12-23 RX ADMIN — Medication 100 MILLIGRAM(S): at 16:44

## 2024-12-23 RX ADMIN — TOPIRAMATE 100 MILLIGRAM(S): 50 TABLET, COATED ORAL at 22:11

## 2024-12-23 NOTE — PATIENT PROFILE ADULT - MST SCORE
Hypertension is unchanged.  Continue current treatment regimen.  Dietary sodium restriction.  Weight loss.  Blood pressure will be reassessed at the next regular appointment.   0

## 2024-12-23 NOTE — H&P ADULT - HISTORY OF PRESENT ILLNESS
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 Patient is very active , biking 4 x week, loop recorder picked up PVCs/ SVTs. Patient denies CP, palpitations, ARDON, LE edema, no acute complaints. Presented to PST for scheduled PVC ablation / ideopathic VT on 12/23/2024

## 2024-12-23 NOTE — H&P ADULT - NSHPREVIEWOFSYSTEMS_GEN_ALL_CORE
REVIEW OF SYSTEMS:    CONSTITUTIONAL:  No weakness, fevers or chills  EYES/ENT:  No visual changes;  No vertigo or throat pain   NECK:  No pain or stiffness  RESPIRATORY:  No cough, wheezing, hemoptysis; No shortness of breath  CARDIOVASCULAR:  No chest pain or palpitations  GASTROINTESTINAL:  No abdominal or epigastric pain. No nausea, vomiting, or hematemesis; No diarrhea or constipation. No melena or hematochezia.  GENITOURINARY:  No dysuria, frequency or hematuria  MUSCULOSKELETAL:  FROM all extremities, normal strength, No calf tenderness, reports lower back soreness  NEUROLOGICAL:  No numbness or weakness  SKIN:  No itching, rashes

## 2024-12-23 NOTE — CHART NOTE - NSCHARTNOTEFT_GEN_A_CORE
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 Patient is very active , biking 4 x week, loop recorder picked up PVCs/ SVTs. Patient denies CP, palpitations, ARDON, LE edema, no acute complaints. Presented to PST for scheduled PVC ablation / ideopathic VT on 12/23/2024. Repeated TTE showing small pericardial effusion with no tamponade physiology     PAST MEDICAL & SURGICAL HISTORY:  Connective tissue disease, undifferentiated      Migraine      HTN (hypertension)      HLD (hyperlipidemia)      Asthma  exercise induced      Peripheral neuropathy      BPH (benign prostatic hyperplasia)      Cardiac arrhythmia      History of atrial fibrillation      History of tonsillectomy      History of cardiac radiofrequency ablation      History of vasectomy      History of colonoscopy          Vital Signs Last 24 Hrs  T(C): 36.4 (23 Dec 2024 14:50), Max: 36.7 (23 Dec 2024 07:20)  T(F): 97.6 (23 Dec 2024 14:50), Max: 98 (23 Dec 2024 07:20)  HR: 74 (23 Dec 2024 18:30) (61 - 93)  BP: 123/63 (23 Dec 2024 18:30) (90/54 - 143/92)  BP(mean): 87 (23 Dec 2024 18:30) (66 - 96)  RR: 20 (23 Dec 2024 18:30) (14 - 21)  SpO2: 99% (23 Dec 2024 16:30) (95% - 100%)    Parameters below as of 23 Dec 2024 16:00  Patient On (Oxygen Delivery Method): room air      I&O's Summary    23 Dec 2024 07:01  -  23 Dec 2024 20:01  --------------------------------------------------------  IN: 400 mL / OUT: 250 mL / NET: 150 mL      Allergies    quinolines (Urticaria; Hives)    Intolerances      MEDICATIONS  (STANDING):  chlorhexidine 2% Cloths 1 Application(s) Topical <User Schedule>  finasteride 5 milliGRAM(s) Oral daily  montelukast 10 milliGRAM(s) Oral at bedtime  rosuvastatin 20 milliGRAM(s) Oral at bedtime  topiramate 100 milliGRAM(s) Oral at bedtime    MEDICATIONS  (PRN):  SUMAtriptan 100 milliGRAM(s) Oral daily PRN Migraine          PHYSICAL EXAM:  GENERAL: NAD, lying in bed comfortably  HEAD:  Atraumatic, normocephalic  EYES: conjunctiva and sclera clear  NECK: Supple  HEART: Regular rate and rhythm, no murmurs, rubs, or gallops  LUNGS: Unlabored respirations.  Clear to auscultation bilaterally, no crackles, wheezing, or rhonchi  ABDOMEN: Soft, nontender, nondistended, +BS  EXTREMITIES: 2+ peripheral pulses bilaterally. No clubbing, cyanosis, or edema  NERVOUS SYSTEM:  A&Ox3, moving all extremities, no focal deficits   SKIN: No rashes or lesions        Lactate:    Ekg:  Telemetry:  Echo:  < from: TTE W or WO Ultrasound Enhancing Agent (12.23.24 @ 12:48) >      TRANSTHORACIC ECHOCARDIOGRAM REPORT  ________________________________________________________________________________                                      _______       Pt. Name:       KARO RAMIREZ Study Date:    12/23/2024  MRN:            [id number]        YOB: 1955  Accession #:    [id number]         Age:           69 years  Account#:       [id number]      Gender:        M  Heart Rate:                       Height:        69.00 in (175.26 cm)  Rhythm:      Weight:        155.00 lb (70.31 kg)  Blood Pressure: 89/50 mmHg        BSA/BMI:       1.85 m² / 22.89 kg/m²  ________________________________________________________________________________________  Referring Physician:    [id number] Chapo Godoy  Interpreting Physician: Cristiana Stanley MD  Primary Sonographer:    Nadia Rodriguez RDCS    CPT:               ECHO TTE W/O CON F/U LTD - 03898.m  Indication(s):     Other pericardial effusion [noninflammatory] - I31.39  Procedure:         Limited transthoracic echocardiogram.  Ordering Location: [facility]  Admission Status:  Outpatient  Study Information: Image quality for this study is adequate.    _______________________________________________________________________________________     CONCLUSIONS:      1. Small pericardial effusion with no echocardiographic evidence of tamponade physiology.    ________________________________________________________________________________________  FINDINGS:     Pericardium:  There is a small pericardialeffusion with no echocardiographic evidence of tamponade physiology.  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 Patient is very active , biking 4 x week, loop recorder picked up PVCs/ SVTs. Patient denies CP, palpitations, ARDON, LE edema, no acute complaints. Presented to PST for scheduled PVC ablation / ideopathic VT on 12/23/2024. Repeated TTE showing small pericardial effusion with no tamponade physiology         FOR FOLLOW UP:  []Discharge home tomorrow as per EP.
Removal of Femoral Sheath    Pulses in the right lower extremity are audible by doppler. The patient was placed in the supine position. The insertion site was identified and the sutures were removed per protocol.  The femoral venous sheaths x2 & femoral arterial sheath x1 were then removed. Direct pressure was applied for 15 minutes.     Monitoring of the right groin and both lower extremities including neuro-vascular checks and vital signs every 15 minutes x 4, then every 30 minutes x 2, then every 1 hour was ordered.    Complications: None.

## 2024-12-23 NOTE — PATIENT PROFILE ADULT - NSPROPTRIGHTCAREGIVER_GEN_A_NUR
S: 7 year old female presenting to clinic today with heartburn issue.    This has been going on for several months. States that it had been happening a couple of times per week and now it is happening more. They do not notice particular trigger foods or other triggers. States that she has had sour taste in her mouth as well. Appetite is fine. No melena or hematochezia. No nausea or vomiting. No diarrhea or constipation. Mother and other family members have had similar issues in the past. No weight change.    Active Ambulatory Problems     Diagnosis Date Noted   • Eczema 05/15/2015     Resolved Ambulatory Problems     Diagnosis Date Noted   • No Resolved Ambulatory Problems     No Additional Past Medical History       Current Outpatient Medications   Medication Sig Dispense Refill   • famotidine (Pepcid) 20 MG tablet Take 1 tablet by mouth in the morning and 1 tablet in the evening. 60 tablet 1   • Pediatric Multivit-Minerals-C (CHILDRENS MULTIVITAMIN) 60 MG Chew Tab        No current facility-administered medications for this visit.       ALLERGIES:   Allergen Reactions   • Amoxicillin RASH     Tolerates omnicef       Family History   Problem Relation Age of Onset   • Cancer Maternal Grandmother 36        breast   • Thyroid Maternal Grandmother         hypo   • Gastrointestinal Maternal Grandfather         GERD   • Gastrointestinal Mother         GERD   • Eczema Mother    • Multiple Sclerosis Mother    • Asthma Paternal Aunt    • Depression Paternal Aunt    • Musculoskeletal Paternal Aunt         chronic low back pain   • Thyroid Paternal Grandmother         hypo   • Arrhythmia Paternal Grandmother    • Migraine Paternal Grandmother    • Musculoskeletal Father         scoliosis, mild   • Diabetes Other         Maternal Great Uncle.       Social History     Socioeconomic History   • Marital status: Single     Spouse name: Not on file   • Number of children: Not on file   • Years of education: Not on file   • Highest  education level: Not on file   Occupational History   • Not on file   Tobacco Use   • Smoking status: Never   • Smokeless tobacco: Never   Substance and Sexual Activity   • Alcohol use: Not on file   • Drug use: Not on file   • Sexual activity: Not on file   Other Topics Concern   • Not on file   Social History Narrative    History of prematurity - No.       Complications of pregnancy or birth -  No.       History of intubation or mechanical ventilation -  No.      Problems with development of speech or coordination - No. History of learning issues - No.       History of feeding difficulties - No.      History of noisy breathing - No.       Immunizations NOT up to date - No.      Family History of alcohol, cigarette or drug use - No.      Child lives with parents.       Legal guardian of child is parents.       Parental marital status - .          No family history of bleeding disorders or anesthesia reactions.     Social Determinants of Health     Financial Resource Strain: Not on file   Food Insecurity: Not on file   Transportation Needs: Not on file   Physical Activity: Not on file   Stress: Not on file   Social Connections: Not on file   Intimate Partner Violence: Not on file       PMH/Meds/Allergies/FH/SH were reviewed with patient and updated as appropriate in electronic medical record     Review of Systems:  General: no weight loss, no fever, no appetite change  Skin: no rashes  HEENT: no eye drainage, no ear drainage, no epistaxis, no mouth lesions, no lumps, no swollen glands  Breast: no lumps, no nipple discharge  Respiratory: no cough, no wheezing, no shortness of breath  Cardiac: no chest pain, no palpitations  Gastrointestinal: no vomiting, no diarrhea, no constipation, no melena, no hematochezia  Genitourinary: no change in urination, no abnormal smelling urine, no hematuria  Musculoskeletal: no joint swelling, no joint stiffness  Psychiatric: no change in behavior, no fussiness  Hematologic: no  easy bruising, no abnormal bleeding    Physical exam:  Visit Vitals  /78   Pulse 79   Temp 98 °F (36.7 °C)   Ht 4' 6.49\" (1.384 m)   Wt 35.9 kg (79 lb 3.2 oz)   SpO2 98%   BMI 18.76 kg/m²     General: well nourished, well developed female in NAD, alert and oriented x3  HEENT: oral mucous membranes moist, throat clear, no adenopathy, TM clear bilaterally  Cardiac: RRR, S1/S2, no murmurs/rubs/gallops  Lungs: clear to auscultation bilaterally, breathing comfortably in no respiratory distress  Abdomen: nondistended, nontender. +BS. No hepatomegaly or splenomegaly noted.  Extremities: Warm and well perfused. No edema, erythema or tenderness in bilateral lower extremities. Moves all extremities well. 2+ dorsalis pedis pulses palpable bilaterally.    A/P: 7 year old female presenting with     1. Gastroesophageal reflux disease without esophagitis  No red flags. Trial of pepcid, side effects discussed. Supportive care. Warning signs and trigger avoidance discussed.    F/u in 6 weeks or sooner if needed.       no

## 2024-12-23 NOTE — H&P ADULT - ATTENDING COMMENTS
70 yo man s/p RVOt ablation found to have pericardial effusion post procedure and was transferred to CICU for observation    A+Ox3  Hemodynamics acceptable, no pressors or inotropes  small pericardial effusion, no evidence of tamponade, will cont to monitor    O2 sats mid to high 90s on  room air  DASH diet  Normal renal function  H/H normal  SCDs for DVT prophylaxis   Afebrile, no antibiotics  Sugars controlled

## 2024-12-23 NOTE — H&P ADULT - NSICDXPASTMEDICALHX_GEN_ALL_CORE_FT
PAST MEDICAL HISTORY:  Asthma exercise induced    BPH (benign prostatic hyperplasia)     Cardiac arrhythmia     Connective tissue disease, undifferentiated     History of atrial fibrillation     HLD (hyperlipidemia)     HTN (hypertension)     Migraine     Peripheral neuropathy

## 2024-12-23 NOTE — PROGRESS NOTE ADULT - SUBJECTIVE AND OBJECTIVE BOX
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 Patient is very active , biking 4 x week, loop recorder picked up PVCs/ SVTs. Patient denies CP, palpitations, ARDON, LE edema, no acute complaints. Presented to PST for scheduled PVC ablation / ideopathic VT on 12/23/2024 (23 Dec 2024 15:26)      Hospital Course:    24 HOUR EVENTS:    REVIEW OF SYSTEMS:    CONSTITUTIONAL: No weakness, fevers or chills  EYES/ENT: No visual changes;  No vertigo or throat pain   NECK: No pain or stiffness  RESPIRATORY: No cough, wheezing, hemoptysis; No shortness of breath  CARDIOVASCULAR: No chest pain or palpitations  GASTROINTESTINAL: No abdominal or epigastric pain. No nausea, vomiting, or hematemesis; No diarrhea or constipation. No melena or hematochezia.  GENITOURINARY: No dysuria, frequency or hematuria  NEUROLOGICAL: No numbness or weakness  SKIN: No itching, rashes      ICU Vital Signs Last 24 Hrs  T(C): 36.4 (23 Dec 2024 19:00), Max: 36.7 (23 Dec 2024 07:20)  T(F): 97.6 (23 Dec 2024 19:00), Max: 98 (23 Dec 2024 07:20)  HR: 73 (23 Dec 2024 20:00) (61 - 93)  BP: 132/62 (23 Dec 2024 20:00) (90/54 - 143/92)  BP(mean): 89 (23 Dec 2024 20:00) (66 - 102)  ABP: 127/74 (23 Dec 2024 16:30) (96/55 - 162/88)  ABP(mean): 93 (23 Dec 2024 16:30) (71 - 114)  RR: 34 (23 Dec 2024 20:00) (14 - 34)  SpO2: 99% (23 Dec 2024 16:30) (95% - 100%)    O2 Parameters below as of 23 Dec 2024 16:00  Patient On (Oxygen Delivery Method): room air            CVP(mm Hg): --  CO: --  CI: --  PA: --  PA(mean): --  PA(direct): --  PCWP: --  LA: --  RA: --  SVR: --  SVRI: --  PVR: --  PVRI: --  I&O's Summary    23 Dec 2024 07:01  -  23 Dec 2024 20:54  --------------------------------------------------------  IN: 400 mL / OUT: 250 mL / NET: 150 mL        CAPILLARY BLOOD GLUCOSE    CAPILLARY BLOOD GLUCOSE          PHYSICAL EXAM:  GENERAL: No acute distress, well-developed  HEAD:  Atraumatic, Normocephalic  EYES: EOMI, PERRLA, conjunctiva and sclera clear  NECK: Supple, no lymphadenopathy, no JVD  CHEST/LUNG: CTAB; No wheezes, rales, or rhonchi  HEART: Regular rate and rhythm. Normal S1/S2. No murmurs, rubs, or gallops  ABDOMEN: Soft, non-tender, non-distended; normal bowel sounds, no organomegaly  EXTREMITIES:  2+ peripheral pulses b/l, No clubbing, cyanosis, or edema  NEUROLOGY: A&O x 3, no focal deficits  SKIN: No rashes or lesions    ============================I/O===========================   I&O's Detail    23 Dec 2024 07:01  -  23 Dec 2024 20:54  --------------------------------------------------------  IN:    Oral Fluid: 400 mL  Total IN: 400 mL    OUT:    Voided (mL): 250 mL  Total OUT: 250 mL    Total NET: 150 mL        ============================ LABS =========================                                ======================Micro/Rad/Cardio=================  Telemtry: Reviewed   EKG: Reviewed  CXR: Reviewed  Culture: Reviewed   Echo:   Cath:   ======================================================  PAST MEDICAL & SURGICAL HISTORY:  Connective tissue disease, undifferentiated      Migraine      HTN (hypertension)      HLD (hyperlipidemia)      Asthma  exercise induced      Peripheral neuropathy      BPH (benign prostatic hyperplasia)      Cardiac arrhythmia      History of atrial fibrillation      History of tonsillectomy      History of cardiac radiofrequency ablation      History of vasectomy      History of colonoscopy        ====================ASSESSMENT ==============  69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 Presented for scheduled PVC ablation and admitted for small pulmonary effusion.    Plan:  ====================== NEUROLOGY=====================  SUMAtriptan 100 milliGRAM(s) Oral daily PRN Migraine  topiramate 100 milliGRAM(s) Oral at bedtime    A&O x4   - No needs at this time  - Cont home Migraine meds  ==================== RESPIRATORY======================  Mechanical Ventilation:    montelukast 10 milliGRAM(s) Oral at bedtime    On RA  - Cont Singulair  ====================CARDIOVASCULAR==================  # Small pericardial effusion  - will obtain repeat echo    - Will monitor pt for signs of Tamponade    #hx of PVCs  - Will monitor on monitor  ===================HEMATOLOGIC/ONC ===================    ===================== RENAL =========================  Continue monitoring urine output    ==================== GASTROINTESTINAL===================  - Normal Diet no need for GI prophylaxis  =======================    ENDOCRINE  =====================  finasteride 5 milliGRAM(s) Oral daily  rosuvastatin 20 milliGRAM(s) Oral at bedtime    ========================INFECTIOUS DISEASE================  - afebrile no acute needs        Patient requires continuous monitoring with bedside rhythm monitoring, pulse ox monitoring, and intermittent blood gas analysis. Care plan discussed with ICU care team. Patient remained critical and at risk for life threatening decompensation.  Patient seen, examined and plan discussed with CCU team during rounds.     I have personally provided ____ minutes Obesity OHS / OSAf critical care time excluding time spent on separate procedures, in addition to initial critical care time provided by the CICU Attending, Dr. Blanchard    By signing my name below, I, Earline Parker, attest that this documentation has been prepared under the direction and in the presence of Cristina Aden NP  Electronically signed: Myra Royal, 12-23-24 @ 20:54    I, Cristina Aden NP, personally performed the services described in this documentation. all medical record entries made by the miraibe were at my direction and in my presence. 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 Patient is very active , biking 4 x week, loop recorder picked up PVCs/ SVTs. Patient denies CP, palpitations, ARDON, LE edema, no acute complaints. Presented to PST for scheduled PVC ablation / ideopathic VT on 12/23/2024 (23 Dec 2024 15:26)      Hospital Course:    24 HOUR EVENTS:    REVIEW OF SYSTEMS:    CONSTITUTIONAL: No weakness, fevers or chills  EYES/ENT: No visual changes;  No vertigo or throat pain   NECK: No pain or stiffness  RESPIRATORY: No cough, wheezing, hemoptysis; No shortness of breath  CARDIOVASCULAR: No chest pain or palpitations  GASTROINTESTINAL: No abdominal or epigastric pain. No nausea, vomiting, or hematemesis; No diarrhea or constipation. No melena or hematochezia.  GENITOURINARY: No dysuria, frequency or hematuria  NEUROLOGICAL: No numbness or weakness  SKIN: No itching, rashes      ICU Vital Signs Last 24 Hrs  T(C): 36.4 (23 Dec 2024 19:00), Max: 36.7 (23 Dec 2024 07:20)  T(F): 97.6 (23 Dec 2024 19:00), Max: 98 (23 Dec 2024 07:20)  HR: 73 (23 Dec 2024 20:00) (61 - 93)  BP: 132/62 (23 Dec 2024 20:00) (90/54 - 143/92)  BP(mean): 89 (23 Dec 2024 20:00) (66 - 102)  ABP: 127/74 (23 Dec 2024 16:30) (96/55 - 162/88)  ABP(mean): 93 (23 Dec 2024 16:30) (71 - 114)  RR: 34 (23 Dec 2024 20:00) (14 - 34)  SpO2: 99% (23 Dec 2024 16:30) (95% - 100%)    O2 Parameters below as of 23 Dec 2024 16:00  Patient On (Oxygen Delivery Method): room air      I&O's Summary    23 Dec 2024 07:01  -  23 Dec 2024 20:54  --------------------------------------------------------  IN: 400 mL / OUT: 250 mL / NET: 150 mL        CAPILLARY BLOOD GLUCOSE    CAPILLARY BLOOD GLUCOSE          PHYSICAL EXAM:  GENERAL: No acute distress, well-developed  HEAD:  Atraumatic, Normocephalic  EYES: EOMI, PERRLA, conjunctiva and sclera clear  NECK: Supple, no lymphadenopathy, no JVD  CHEST/LUNG: CTAB; No wheezes, rales, or rhonchi  HEART: Regular rate and rhythm. Normal S1/S2. No murmurs, rubs, or gallops  ABDOMEN: Soft, non-tender, non-distended; normal bowel sounds, no organomegaly  EXTREMITIES:  2+ peripheral pulses b/l, No clubbing, cyanosis, or edema  NEUROLOGY: A&O x 3, no focal deficits  SKIN: No rashes or lesions    ============================I/O===========================   I&O's Detail    23 Dec 2024 07:01  -  23 Dec 2024 20:54  --------------------------------------------------------  IN:    Oral Fluid: 400 mL  Total IN: 400 mL    OUT:    Voided (mL): 250 mL  Total OUT: 250 mL    Total NET: 150 mL        ============================ LABS =========================                                ======================Micro/Rad/Cardio=================  Telemtry: Reviewed   EKG: Reviewed  CXR: Reviewed  Culture: Reviewed   Echo:   Cath:   ======================================================  PAST MEDICAL & SURGICAL HISTORY:  Connective tissue disease, undifferentiated      Migraine      HTN (hypertension)      HLD (hyperlipidemia)      Asthma  exercise induced      Peripheral neuropathy      BPH (benign prostatic hyperplasia)      Cardiac arrhythmia      History of atrial fibrillation      History of tonsillectomy      History of cardiac radiofrequency ablation      History of vasectomy      History of colonoscopy        ====================ASSESSMENT ==============  69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 Presented for scheduled PVC ablation and admitted for small pulmonary effusion.    Plan:  ====================== NEUROLOGY=====================  SUMAtriptan 100 milliGRAM(s) Oral daily PRN Migraine  topiramate 100 milliGRAM(s) Oral at bedtime    A&O x4   - No needs at this time  - Cont home Migraine meds  ==================== RESPIRATORY======================  Mechanical Ventilation:    montelukast 10 milliGRAM(s) Oral at bedtime    On RA  - Cont Singulair  ====================CARDIOVASCULAR==================  # Small pericardial effusion  - will obtain repeat echo    - Will monitor pt for signs of Tamponade    #hx of PVCs  - Will monitor on monitor  ===================HEMATOLOGIC/ONC ===================    ===================== RENAL =========================  Continue monitoring urine output    ==================== GASTROINTESTINAL===================  - Normal Diet no need for GI prophylaxis  =======================    ENDOCRINE  =====================  finasteride 5 milliGRAM(s) Oral daily  rosuvastatin 20 milliGRAM(s) Oral at bedtime    ========================INFECTIOUS DISEASE================  - afebrile no acute needs        Patient requires continuous monitoring with bedside rhythm monitoring, pulse ox monitoring, and intermittent blood gas analysis. Care plan discussed with ICU care team. Patient remained critical and at risk for life threatening decompensation.  Patient seen, examined and plan discussed with CCU team during rounds.     I have personally provided __35__ minutes Obesity OHS / OSAf critical care time excluding time spent on separate procedures, in addition to initial critical care time provided by the CICU Attending, Dr. Blanchard    By signing my name below, I, Earline Parker, attest that this documentation has been prepared under the direction and in the presence of Cristina Aden NP  Electronically signed: Myra Royal, 12-23-24 @ 20:54    Cristina WARE NP, personally performed the services described in this documentation. all medical record entries made by the miraibkaren were at my direction and in my presence. I have reviewed the chart and agree that the record reflects my personal performance and is accurate and complete  Electronically signed: Cristnia Aden NP

## 2024-12-23 NOTE — PRE-ANESTHESIA EVALUATION ADULT - NSANTHPMHFT_GEN_ALL_CORE
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 Patient is very active , biking 4 x week, loop recorder picked up PVCs/ SVTs. Patient denies CP, palpitations, ARDON, LE edema, no acute complaints.

## 2024-12-23 NOTE — H&P ADULT - NSHPPHYSICALEXAM_GEN_ALL_CORE
GENERAL: NAD, lying in bed comfortably  HEAD:  Atraumatic, normocephalic  EYES: conjunctiva and sclera clear  NECK: Supple, trachea midline, no JVD  HEART: Regular rate and rhythm, no murmurs, rubs, or gallops  LUNGS: Unlabored respirations.  Clear to auscultation bilaterally, no crackles, wheezing, or rhonchi  ABDOMEN: Soft, nontender, nondistended, +BS  EXTREMITIES: 2+ peripheral pulses bilaterally. No clubbing, cyanosis, or edema  NERVOUS SYSTEM:  A&Ox3, moving all extremities, no focal deficits   SKIN: No rashes or lesions GENERAL: NAD, lying in bed comfortably  HEAD:  Atraumatic, normocephalic  EYES: conjunctiva and sclera clear  NECK: Supple  HEART: Regular rate and rhythm, no murmurs, rubs, or gallops  LUNGS: Unlabored respirations.  Clear to auscultation bilaterally, no crackles, wheezing, or rhonchi  ABDOMEN: Soft, nontender, nondistended, +BS  EXTREMITIES: 2+ peripheral pulses bilaterally. No clubbing, cyanosis, or edema  NERVOUS SYSTEM:  A&Ox3, moving all extremities, no focal deficits   SKIN: No rashes or lesions

## 2024-12-23 NOTE — PRE-OP CHECKLIST - TEMPERATURE IN FAHRENHEIT (DEGREES F)
S: see above; tech note reviewed. AMA/LV noted above.  CC/HPI:     f/u per Dr Olmos   ? Recheck glasses rx OD AGAIN    lv 2/18: stable  Due for f/u in JUNE +-    PT STILL C/O DISTORTION OD  --VERT IN PAST  --NOW HORIZ AS WELL      --OD:  -s/p RD repair 4/14/17: PPVx, gas, Endolaser  (Phaco/IOL OD 7/17, TH)  -s/p PPVx/Membrx 8/17**      No (other) assoc signs/symptoms     OS OK: good  No other new visual c/o      Current need(s) for glasses reviewed: pg 11/17 (PO)**, progr   --does not want to try Bi  --also has rx sungl    Use of eye gtts reviewed:   --AT prn         POH: see above  --VITREOUS DEGEN (PVD OD 5/04)  --s/p RD repair OD 4/14/17: PPVx, gas, Endolaser **  Dr Olmos      REMV CATARACT EXTRACAP INSERT LENS Right 07/20/2017    [id number] 20.0D Dr. NILSA Monroe     --s/p PPVx/Membrx 8/17**  Dr. Olmos       Pupils: 6/3 mm (per tech) : fixed OD  No reverse RAPD per tech  (dilated already when I saw pt)  --OFF ATROPINE since late April per pt**    SLE :       L/L/L:  OD: nl //OS: nl       CONJ:  OD: clear  //OS: clear          CORNEA: OD: mild mdfp //OS: mild mdfp; decr tf        AC:  OD: d,q  // OS: d,q       IRIS:  OD: clear  //OS: clear       LENS:  OD: PC IOL centered**  //OS: 2+ NS  Good dilation OU     FUNDUS: C/D  0.2 /0.1, + svp's OU          vitr clear with nl ON/V/P          mac: OD: mild resid ERM / rpe mottl: in fovea and temp to fovea                OS: clear +-          no break/RD OS  --nice laser demarcation near VB for sev CLOCK HRS marcial SUP OD**          A:  see dx below; tech note reviewed and accepted    P:  see orders/disposition      COMMENT: conditions stable, observation reccommended, pt concerned...reassured, questions answered   NO Rx for glasses given    Main source of symptoms :  Mac OD** (+- cornea)  --PO s/p RD repair and PPVx/Membr       --keep f/u w Dr. Olmos in JUNE +-    RTC 1 YR w me, prn     98

## 2024-12-23 NOTE — PATIENT PROFILE ADULT - FALL HARM RISK - UNIVERSAL INTERVENTIONS
Bed in lowest position, wheels locked, appropriate side rails in place/Call bell, personal items and telephone in reach/Instruct patient to call for assistance before getting out of bed or chair/Non-slip footwear when patient is out of bed/[place] to call system/Physically safe environment - no spills, clutter or unnecessary equipment/Purposeful Proactive Rounding/Room/bathroom lighting operational, light cord in reach

## 2024-12-23 NOTE — H&P ADULT - ASSESSMENT
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 Presented for scheduled PVC ablation and admitted for small pulmonary effusion.     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 Presented for scheduled PVC ablation and admitted for small pulmonary effusion.     Plan:    ==Neuro==  A&O x4   - No needs at this time  - Cont home Migraine meds    === Cards===  # Small pericardial effusion  - will obtain repeat echo    - Will monitor pt for signs of Tamponade    #hx of PVCs  - Will monitor on monitor    ====Resp===  On RA  - Cont Singulair    ===GI===  - Normal Diet no need for GI prophylaxis      ===ID===  - afebrile no acute needs

## 2024-12-24 ENCOUNTER — RESULT REVIEW (OUTPATIENT)
Age: 69
End: 2024-12-24

## 2024-12-24 ENCOUNTER — TRANSCRIPTION ENCOUNTER (OUTPATIENT)
Age: 69
End: 2024-12-24

## 2024-12-24 VITALS
DIASTOLIC BLOOD PRESSURE: 82 MMHG | OXYGEN SATURATION: 100 % | RESPIRATION RATE: 17 BRPM | TEMPERATURE: 98 F | SYSTOLIC BLOOD PRESSURE: 134 MMHG | HEART RATE: 69 BPM

## 2024-12-24 LAB
ALBUMIN SERPL ELPH-MCNC: 4 G/DL — SIGNIFICANT CHANGE UP (ref 3.3–5)
ALP SERPL-CCNC: 51 U/L — SIGNIFICANT CHANGE UP (ref 40–120)
ALT FLD-CCNC: 22 U/L — SIGNIFICANT CHANGE UP (ref 10–45)
ANION GAP SERPL CALC-SCNC: 13 MMOL/L — SIGNIFICANT CHANGE UP (ref 5–17)
AST SERPL-CCNC: 30 U/L — SIGNIFICANT CHANGE UP (ref 10–40)
BILIRUB SERPL-MCNC: 0.5 MG/DL — SIGNIFICANT CHANGE UP (ref 0.2–1.2)
BUN SERPL-MCNC: 16 MG/DL — SIGNIFICANT CHANGE UP (ref 7–23)
CALCIUM SERPL-MCNC: 9.2 MG/DL — SIGNIFICANT CHANGE UP (ref 8.4–10.5)
CHLORIDE SERPL-SCNC: 108 MMOL/L — SIGNIFICANT CHANGE UP (ref 96–108)
CO2 SERPL-SCNC: 18 MMOL/L — LOW (ref 22–31)
CREAT SERPL-MCNC: 0.85 MG/DL — SIGNIFICANT CHANGE UP (ref 0.5–1.3)
EGFR: 93 ML/MIN/1.73M2 — SIGNIFICANT CHANGE UP
GLUCOSE SERPL-MCNC: 130 MG/DL — HIGH (ref 70–99)
HCT VFR BLD CALC: 38.3 % — LOW (ref 39–50)
HGB BLD-MCNC: 13.1 G/DL — SIGNIFICANT CHANGE UP (ref 13–17)
MAGNESIUM SERPL-MCNC: 2 MG/DL — SIGNIFICANT CHANGE UP (ref 1.6–2.6)
MCHC RBC-ENTMCNC: 33.1 PG — SIGNIFICANT CHANGE UP (ref 27–34)
MCHC RBC-ENTMCNC: 34.2 G/DL — SIGNIFICANT CHANGE UP (ref 32–36)
MCV RBC AUTO: 96.7 FL — SIGNIFICANT CHANGE UP (ref 80–100)
NRBC # BLD: 0 /100 WBCS — SIGNIFICANT CHANGE UP (ref 0–0)
PHOSPHATE SERPL-MCNC: 2.1 MG/DL — LOW (ref 2.5–4.5)
PLATELET # BLD AUTO: 129 K/UL — LOW (ref 150–400)
POTASSIUM SERPL-MCNC: 4 MMOL/L — SIGNIFICANT CHANGE UP (ref 3.5–5.3)
POTASSIUM SERPL-SCNC: 4 MMOL/L — SIGNIFICANT CHANGE UP (ref 3.5–5.3)
PROT SERPL-MCNC: 6.2 G/DL — SIGNIFICANT CHANGE UP (ref 6–8.3)
RBC # BLD: 3.96 M/UL — LOW (ref 4.2–5.8)
RBC # FLD: 13.1 % — SIGNIFICANT CHANGE UP (ref 10.3–14.5)
SODIUM SERPL-SCNC: 139 MMOL/L — SIGNIFICANT CHANGE UP (ref 135–145)
WBC # BLD: 7.64 K/UL — SIGNIFICANT CHANGE UP (ref 3.8–10.5)
WBC # FLD AUTO: 7.64 K/UL — SIGNIFICANT CHANGE UP (ref 3.8–10.5)

## 2024-12-24 PROCEDURE — C1894: CPT

## 2024-12-24 PROCEDURE — 93308 TTE F-UP OR LMTD: CPT | Mod: 26

## 2024-12-24 PROCEDURE — 80053 COMPREHEN METABOLIC PANEL: CPT

## 2024-12-24 PROCEDURE — 93308 TTE F-UP OR LMTD: CPT

## 2024-12-24 PROCEDURE — C1732: CPT

## 2024-12-24 PROCEDURE — C1759: CPT

## 2024-12-24 PROCEDURE — 84100 ASSAY OF PHOSPHORUS: CPT

## 2024-12-24 PROCEDURE — 93623 PRGRMD STIMJ&PACG IV RX NFS: CPT

## 2024-12-24 PROCEDURE — 93662 INTRACARDIAC ECG (ICE): CPT

## 2024-12-24 PROCEDURE — 85027 COMPLETE CBC AUTOMATED: CPT

## 2024-12-24 PROCEDURE — 93355 ECHO TRANSESOPHAGEAL (TEE): CPT

## 2024-12-24 PROCEDURE — 93622 COMP EP EVAL L VENTR PAC&REC: CPT

## 2024-12-24 PROCEDURE — 93005 ELECTROCARDIOGRAM TRACING: CPT

## 2024-12-24 PROCEDURE — 83735 ASSAY OF MAGNESIUM: CPT

## 2024-12-24 PROCEDURE — 93654 COMPRE EP EVAL TX VT: CPT

## 2024-12-24 PROCEDURE — 93613 INTRACARDIAC EPHYS 3D MAPG: CPT

## 2024-12-24 RX ORDER — POTASSIUM PHOSPHATE, MONOBASIC AND POTASSIUM PHOSPHATE, DIBASIC 224; 236 MG/ML; MG/ML
15 INJECTION, SOLUTION INTRAVENOUS ONCE
Refills: 0 | Status: COMPLETED | OUTPATIENT
Start: 2024-12-24 | End: 2024-12-24

## 2024-12-24 RX ORDER — SUMATRIPTAN SUCCINATE 6 MG/0.5ML
100 PEN INJECTOR (ML) SUBCUTANEOUS ONCE
Refills: 0 | Status: COMPLETED | OUTPATIENT
Start: 2024-12-24 | End: 2024-12-24

## 2024-12-24 RX ADMIN — POTASSIUM PHOSPHATE, MONOBASIC AND POTASSIUM PHOSPHATE, DIBASIC 62.5 MILLIMOLE(S): 224; 236 INJECTION, SOLUTION INTRAVENOUS at 08:42

## 2024-12-24 RX ADMIN — Medication 100 MILLIGRAM(S): at 00:01

## 2024-12-24 RX ADMIN — Medication 100 MILLIGRAM(S): at 08:42

## 2024-12-24 RX ADMIN — Medication 5 MILLIGRAM(S): at 08:49

## 2024-12-24 RX ADMIN — Medication 100 MILLIGRAM(S): at 01:00

## 2024-12-24 NOTE — DISCHARGE NOTE NURSING/CASE MANAGEMENT/SOCIAL WORK - NSDCVIVACCINE_GEN_ALL_CORE_FT
influenza, injectable, quadrivalent, preservative free; 08-Sep-2017 12:38; Jayna Roach (RN); [employer]; [id number]; IntraMuscular; Deltoid Right.; 0.5 milliLiter(s); VIS (VIS Published: 07-Aug-2015, VIS Presented: 08-Sep-2017);

## 2024-12-24 NOTE — PROGRESS NOTE ADULT - ASSESSMENT
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 Patient is very active, biking 4 x week, loop recorder picked up PVC's/SVT's. Patient denies CP, palpitations, ARDON, LE edema, no acute complaints. Presented to PST for scheduled PVC ablation / idiopathic VT on 12/23/2024, was admitted to CICU for obs for development of small pericardial effusion w/o tamponade.     1) S/p PVC ablation with development of small effusion   - TTE performed 12/23 w/ small effusion w/o tamponade, repeat TTE this AM pending read  - Hemodynamics and perfusion markers stable  - Tele revealing SR w/o arrhythmia   - Continue tele monitoring   Plan for d/c home later today pending TTE read. QUOC w/ Dr. Godoy 2/11/25 11:30AM

## 2024-12-24 NOTE — PROGRESS NOTE ADULT - SUBJECTIVE AND OBJECTIVE BOX
24H hour events: SR w/ infreq PAC's   3.1s PAT this AM    MEDICATIONS:      montelukast 10 milliGRAM(s) Oral at bedtime    SUMAtriptan 100 milliGRAM(s) Oral daily PRN  topiramate 100 milliGRAM(s) Oral at bedtime      finasteride 5 milliGRAM(s) Oral daily  rosuvastatin 20 milliGRAM(s) Oral at bedtime        REVIEW OF SYSTEMS:  See HPI, otherwise ROS negative.    PHYSICAL EXAM:  T(C): 36.8 (12-24-24 @ 04:37), Max: 36.8 (12-24-24 @ 04:37)  HR: 64 (12-24-24 @ 04:37) (64 - 93)  BP: 115/76 (12-24-24 @ 04:37) (90/54 - 138/76)  RR: 18 (12-24-24 @ 04:37) (14 - 34)  SpO2: 99% (12-24-24 @ 04:37) (95% - 100%)  Wt(kg): --  I&O's Summary    23 Dec 2024 07:01  -  24 Dec 2024 07:00  --------------------------------------------------------  IN: 400 mL / OUT: 250 mL / NET: 150 mL    24 Dec 2024 07:01  -  24 Dec 2024 09:42  --------------------------------------------------------  IN: 0 mL / OUT: 400 mL / NET: -400 mL        Appearance: Alert. NAD	  Cardiovascular: +S1S2 RRR no m/g/r  Respiratory: CTA B/L	  Psychiatry: A & O x 3, Mood & affect appropriate  Gastrointestinal:  Soft, NT. ND. +BS	  Skin: No rashes	masses or lesions  Neurologic: Non-focal  Extremities: No edema BLE  Vascular: Peripheral pulses palpable 2+ bilaterally      LABS:	 	    CBC Full  -  ( 24 Dec 2024 07:15 )  WBC Count : 7.64 K/uL  Hemoglobin : 13.1 g/dL  Hematocrit : 38.3 %  Platelet Count - Automated : 129 K/uL  Mean Cell Volume : 96.7 fl  Mean Cell Hemoglobin : 33.1 pg  Mean Cell Hemoglobin Concentration : 34.2 g/dL  Auto Neutrophil # : x  Auto Lymphocyte # : x  Auto Monocyte # : x  Auto Eosinophil # : x  Auto Basophil # : x  Auto Neutrophil % : x  Auto Lymphocyte % : x  Auto Monocyte % : x  Auto Eosinophil % : x  Auto Basophil % : x    12-24    139  |  108  |  16  ----------------------------<  130[H]  4.0   |  18[L]  |  0.85    Ca    9.2      24 Dec 2024 07:15  Phos  2.1     12-24  Mg     2.0     12-24    TPro  6.2  /  Alb  4.0  /  TBili  0.5  /  DBili  x   /  AST  30  /  ALT  22  /  AlkPhos  51  12-24    TELEMETRY: SR

## 2024-12-24 NOTE — DISCHARGE NOTE NURSING/CASE MANAGEMENT/SOCIAL WORK - PATIENT PORTAL LINK FT
You can access the FollowMyHealth Patient Portal offered by [facility] by registering at the following website: [email or web address]. By joining [employer]’s FollowMyHealth portal, you will also be able to view your health information using other applications (apps) compatible with our system.

## 2024-12-24 NOTE — DISCHARGE NOTE PROVIDER - NSDCMRMEDTOKEN_GEN_ALL_CORE_FT
finasteride 5 mg oral tablet: 1 tab(s) orally once a day  Imitrex 100 mg oral tablet: 1 tab(s) orally once, As Needed  rosuvastatin 20 mg oral tablet: 1 tab(s) orally once a day (at bedtime)  Singulair 10 mg oral tablet: 1 tab(s) orally once a day (at bedtime)  topiramate 100 mg oral capsule, extended release: 1 cap(s) orally once a day (at bedtime)  Vitamin D3 1000 intl units oral tablet: 1 tab(s) orally once a day

## 2024-12-24 NOTE — DISCHARGE NOTE PROVIDER - NSDCCPCAREPLAN_GEN_ALL_CORE_FT
PRINCIPAL DISCHARGE DIAGNOSIS  Diagnosis: PVC (premature ventricular contraction)  Assessment and Plan of Treatment: s/p ablation on 12/23.  Follow up with Dr. Godoy on  2/11/25 11:30 AM      SECONDARY DISCHARGE DIAGNOSES  Diagnosis: Pericardial effusion  Assessment and Plan of Treatment: You were found to have on TTE: small pericardial effusion noted adjacent to the right ventricle, small pericardial effusion noted adjacent to the posterior left ventricle and trace pericardial effusion noted adjacent to the right atrium with no echocardiographic evidence of tamponade physiology.  Repeat TTE stable.

## 2024-12-24 NOTE — DISCHARGE NOTE NURSING/CASE MANAGEMENT/SOCIAL WORK - FINANCIAL ASSISTANCE
[facility] provides services at a reduced cost to those who are determined to be eligible through [facility]’s financial assistance program. Information regarding [facility]’s financial assistance program can be found by going to [email or web address] or by calling [phone].

## 2024-12-24 NOTE — DISCHARGE NOTE PROVIDER - PROVIDER TOKENS
PROVIDER:[TOKEN:[[id number]],SCHEDULEDAPPT:[02/11/2025],SCHEDULEDAPPTTIME:[11:30 AM]],PROVIDER:[TOKEN:[[id number]],FOLLOWUP:[1 week],ESTABLISHEDPATIENT:[T]]

## 2024-12-24 NOTE — DISCHARGE NOTE PROVIDER - NSDCFUSCHEDAPPT_GEN_ALL_CORE_FT
[facility]  ELECTROPH [address]  Scheduled Appointment: 01/07/2025    Chapo Godoy  [facility]  ELECTROPH [address]  Scheduled Appointment: 02/11/2025

## 2024-12-24 NOTE — DISCHARGE NOTE PROVIDER - CARE PROVIDER_API CALL
Chapo Godoy.  Cardiac Electrophysiology  [address] [place] [id number]  Phone: [phone]  Fax: [phone]  Scheduled Appointment: 02/11/2025 11:30 AM    Jung Luke  Gastroenterology  [address] [facility] [facility], [address]  [place] [address]  Phone: [phone]  Fax: [phone]  Established Patient  Follow Up Time: 1 week

## 2024-12-24 NOTE — DISCHARGE NOTE NURSING/CASE MANAGEMENT/SOCIAL WORK - HAS THE PATIENT RECEIVED THE INFLUENZA VACCINE THIS SEASON?
current antihypertensive regimen: none  regimen changes: begin on losartan/hydrochlorothiazide 50/12.5mg daily  intolerance:   future titration/work-up plan:  - goal bp <130/80  - observed AV nicking by eye specialist  - questionable association with more recent onset tension HAs   yes...

## 2024-12-24 NOTE — DISCHARGE NOTE PROVIDER - CARE PROVIDERS DIRECT ADDRESSES
[email or web address]

## 2024-12-24 NOTE — DISCHARGE NOTE PROVIDER - HOSPITAL COURSE
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 Patient is very active , biking 4 x week, loop recorder picked up PVCs/ SVTs. Patient denies CP, palpitations, ARDON, LE edema, no acute complaints. Presented to PST for scheduled PVC ablation / ideopathic VT on 12/23/2024 (23 Dec 2024 15:26)    Hospital Course:  Pt admitted for scheduled PVC ablation on 12/23. Complicated by development of small pericardial effusion. No tamponade. Repeat TTE stable. Noted w/ same small pericardial effusion noted adjacent to right ventricle, small pericardial effusion noted adjacent to the posterior left ventricle and trace pericardial effusion noted adjacent to the right atrium with no echocardiographic evidence of tamponade physiology.  Pt was monitored on tele - SR w/o arrhythmia. Hemodynamically stable. Cleared by EP for discharge home today. Will f/u with Dr. Godoy on 2/11/25 11:30 AM.     Important Medication Changes and Reason:    Active or Pending Issues Requiring Follow-up:  - f/u with Dr. Godoy on 2/11/25 11:30 AM    Advanced Directives:   [x ] Full code  [ ] DNR  [ ] Hospice    Discharge Diagnoses:  PVC  Pericardial effusion

## 2025-01-02 ENCOUNTER — TRANSCRIPTION ENCOUNTER (OUTPATIENT)
Age: 70
End: 2025-01-02

## 2025-01-07 ENCOUNTER — NON-APPOINTMENT (OUTPATIENT)
Age: 70
End: 2025-01-07

## 2025-01-07 ENCOUNTER — APPOINTMENT (OUTPATIENT)
Dept: ELECTROPHYSIOLOGY | Facility: CLINIC | Age: 70
End: 2025-01-07
Payer: MEDICARE

## 2025-01-07 PROCEDURE — 93298 REM INTERROG DEV EVAL SCRMS: CPT

## 2025-01-09 ENCOUNTER — NON-APPOINTMENT (OUTPATIENT)
Age: 70
End: 2025-01-09

## 2025-01-09 ENCOUNTER — TRANSCRIPTION ENCOUNTER (OUTPATIENT)
Age: 70
End: 2025-01-09

## 2025-01-09 PROBLEM — Z86.79 PERSONAL HISTORY OF OTHER DISEASES OF THE CIRCULATORY SYSTEM: Chronic | Status: ACTIVE | Noted: 2024-12-16

## 2025-01-09 PROBLEM — N40.0 BENIGN PROSTATIC HYPERPLASIA WITHOUT LOWER URINARY TRACT SYMPTOMS: Chronic | Status: ACTIVE | Noted: 2024-12-16

## 2025-01-10 PROBLEM — G62.9 POLYNEUROPATHY, UNSPECIFIED: Chronic | Status: ACTIVE | Noted: 2024-12-16

## 2025-01-10 PROBLEM — I49.9 CARDIAC ARRHYTHMIA, UNSPECIFIED: Chronic | Status: ACTIVE | Noted: 2024-12-16

## 2025-01-13 ENCOUNTER — TRANSCRIPTION ENCOUNTER (OUTPATIENT)
Age: 70
End: 2025-01-13

## 2025-02-05 ENCOUNTER — NON-APPOINTMENT (OUTPATIENT)
Age: 70
End: 2025-02-05

## 2025-02-07 ENCOUNTER — TRANSCRIPTION ENCOUNTER (OUTPATIENT)
Age: 70
End: 2025-02-07

## 2025-02-11 ENCOUNTER — APPOINTMENT (OUTPATIENT)
Dept: ELECTROPHYSIOLOGY | Facility: CLINIC | Age: 70
End: 2025-02-11
Payer: MEDICARE

## 2025-02-11 ENCOUNTER — NON-APPOINTMENT (OUTPATIENT)
Age: 70
End: 2025-02-11

## 2025-02-11 VITALS
SYSTOLIC BLOOD PRESSURE: 119 MMHG | HEART RATE: 73 BPM | DIASTOLIC BLOOD PRESSURE: 75 MMHG | OXYGEN SATURATION: 98 % | WEIGHT: 156 LBS | BODY MASS INDEX: 23.04 KG/M2

## 2025-02-11 DIAGNOSIS — I48.91 UNSPECIFIED ATRIAL FIBRILLATION: ICD-10-CM

## 2025-02-11 DIAGNOSIS — R00.2 PALPITATIONS: ICD-10-CM

## 2025-02-11 DIAGNOSIS — I49.3 VENTRICULAR PREMATURE DEPOLARIZATION: ICD-10-CM

## 2025-02-11 PROCEDURE — 93285 PRGRMG DEV EVAL SCRMS IP: CPT

## 2025-02-11 PROCEDURE — 99213 OFFICE O/P EST LOW 20 MIN: CPT

## 2025-02-24 ENCOUNTER — TRANSCRIPTION ENCOUNTER (OUTPATIENT)
Age: 70
End: 2025-02-24

## 2025-03-07 ENCOUNTER — OUTPATIENT (OUTPATIENT)
Dept: OUTPATIENT SERVICES | Facility: HOSPITAL | Age: 70
LOS: 1 days | End: 2025-03-07
Payer: MEDICARE

## 2025-03-07 ENCOUNTER — APPOINTMENT (OUTPATIENT)
Dept: MRI IMAGING | Facility: CLINIC | Age: 70
End: 2025-03-07

## 2025-03-07 DIAGNOSIS — Z90.89 ACQUIRED ABSENCE OF OTHER ORGANS: Chronic | ICD-10-CM

## 2025-03-07 DIAGNOSIS — Z98.52 VASECTOMY STATUS: Chronic | ICD-10-CM

## 2025-03-07 DIAGNOSIS — Z98.890 OTHER SPECIFIED POSTPROCEDURAL STATES: Chronic | ICD-10-CM

## 2025-03-07 DIAGNOSIS — I49.3 VENTRICULAR PREMATURE DEPOLARIZATION: ICD-10-CM

## 2025-03-07 PROCEDURE — 75561 CARDIAC MRI FOR MORPH W/DYE: CPT | Mod: 26

## 2025-03-07 PROCEDURE — A9585: CPT

## 2025-03-07 PROCEDURE — 75565 CARD MRI VELOC FLOW MAPPING: CPT | Mod: 26

## 2025-03-07 PROCEDURE — 75565 CARD MRI VELOC FLOW MAPPING: CPT

## 2025-03-07 PROCEDURE — 75561 CARDIAC MRI FOR MORPH W/DYE: CPT

## 2025-03-14 ENCOUNTER — APPOINTMENT (OUTPATIENT)
Dept: ELECTROPHYSIOLOGY | Facility: CLINIC | Age: 70
End: 2025-03-14
Payer: MEDICARE

## 2025-03-14 ENCOUNTER — NON-APPOINTMENT (OUTPATIENT)
Age: 70
End: 2025-03-14

## 2025-03-14 PROCEDURE — 93298 REM INTERROG DEV EVAL SCRMS: CPT

## 2025-03-17 ENCOUNTER — NON-APPOINTMENT (OUTPATIENT)
Age: 70
End: 2025-03-17

## 2025-03-20 RX ORDER — METOPROLOL SUCCINATE 25 MG/1
25 TABLET, EXTENDED RELEASE ORAL DAILY
Qty: 30 | Refills: 0 | Status: ACTIVE | COMMUNITY
Start: 2025-03-20 | End: 1900-01-01

## 2025-03-21 ENCOUNTER — TRANSCRIPTION ENCOUNTER (OUTPATIENT)
Age: 70
End: 2025-03-21

## 2025-03-24 ENCOUNTER — TRANSCRIPTION ENCOUNTER (OUTPATIENT)
Age: 70
End: 2025-03-24

## 2025-04-07 ENCOUNTER — TRANSCRIPTION ENCOUNTER (OUTPATIENT)
Age: 70
End: 2025-04-07

## 2025-04-17 ENCOUNTER — APPOINTMENT (OUTPATIENT)
Dept: ELECTROPHYSIOLOGY | Facility: CLINIC | Age: 70
End: 2025-04-17
Payer: MEDICARE

## 2025-04-17 ENCOUNTER — NON-APPOINTMENT (OUTPATIENT)
Age: 70
End: 2025-04-17

## 2025-04-17 PROCEDURE — 93298 REM INTERROG DEV EVAL SCRMS: CPT

## 2025-05-02 ENCOUNTER — TRANSCRIPTION ENCOUNTER (OUTPATIENT)
Age: 70
End: 2025-05-02

## 2025-05-02 RX ORDER — METOPROLOL TARTRATE 25 MG/1
25 TABLET ORAL TWICE DAILY
Qty: 30 | Refills: 3 | Status: ACTIVE | COMMUNITY
Start: 2025-05-02 | End: 1900-01-01

## 2025-05-06 ENCOUNTER — TRANSCRIPTION ENCOUNTER (OUTPATIENT)
Age: 70
End: 2025-05-06

## 2025-05-27 ENCOUNTER — TRANSCRIPTION ENCOUNTER (OUTPATIENT)
Age: 70
End: 2025-05-27

## 2025-05-29 ENCOUNTER — TRANSCRIPTION ENCOUNTER (OUTPATIENT)
Age: 70
End: 2025-05-29

## 2025-06-05 ENCOUNTER — TRANSCRIPTION ENCOUNTER (OUTPATIENT)
Age: 70
End: 2025-06-05

## 2025-07-01 ENCOUNTER — APPOINTMENT (OUTPATIENT)
Dept: ELECTROPHYSIOLOGY | Facility: CLINIC | Age: 70
End: 2025-07-01
Payer: MEDICARE

## 2025-07-01 VITALS — HEART RATE: 50 BPM | SYSTOLIC BLOOD PRESSURE: 145 MMHG | DIASTOLIC BLOOD PRESSURE: 77 MMHG | OXYGEN SATURATION: 100 %

## 2025-07-01 PROCEDURE — 99215 OFFICE O/P EST HI 40 MIN: CPT

## 2025-07-01 PROCEDURE — 93285 PRGRMG DEV EVAL SCRMS IP: CPT

## 2025-08-05 ENCOUNTER — APPOINTMENT (OUTPATIENT)
Dept: ELECTROPHYSIOLOGY | Facility: CLINIC | Age: 70
End: 2025-08-05
Payer: MEDICARE

## 2025-08-05 ENCOUNTER — NON-APPOINTMENT (OUTPATIENT)
Age: 70
End: 2025-08-05

## 2025-08-05 PROCEDURE — 93298 REM INTERROG DEV EVAL SCRMS: CPT

## 2025-09-09 ENCOUNTER — APPOINTMENT (OUTPATIENT)
Dept: ELECTROPHYSIOLOGY | Facility: CLINIC | Age: 70
End: 2025-09-09
Payer: MEDICARE

## 2025-09-09 ENCOUNTER — NON-APPOINTMENT (OUTPATIENT)
Age: 70
End: 2025-09-09

## 2025-09-09 PROCEDURE — 93298 REM INTERROG DEV EVAL SCRMS: CPT

## 2025-09-17 ENCOUNTER — APPOINTMENT (OUTPATIENT)
Dept: OPHTHALMOLOGY | Facility: CLINIC | Age: 70
End: 2025-09-17
Payer: MEDICARE

## 2025-09-17 ENCOUNTER — NON-APPOINTMENT (OUTPATIENT)
Age: 70
End: 2025-09-17

## 2025-09-17 PROCEDURE — 92014 COMPRE OPH EXAM EST PT 1/>: CPT
